# Patient Record
Sex: MALE | Race: WHITE | NOT HISPANIC OR LATINO | URBAN - METROPOLITAN AREA
[De-identification: names, ages, dates, MRNs, and addresses within clinical notes are randomized per-mention and may not be internally consistent; named-entity substitution may affect disease eponyms.]

---

## 2017-12-11 ENCOUNTER — APPOINTMENT (OUTPATIENT)
Dept: LAB | Facility: HOSPITAL | Age: 67
End: 2017-12-11
Attending: UROLOGY
Payer: COMMERCIAL

## 2017-12-11 ENCOUNTER — GENERIC CONVERSION - ENCOUNTER (OUTPATIENT)
Dept: OTHER | Facility: OTHER | Age: 67
End: 2017-12-11

## 2017-12-11 DIAGNOSIS — C67.2 MALIGNANT NEOPLASM OF LATERAL WALL OF BLADDER (HCC): ICD-10-CM

## 2017-12-11 PROCEDURE — 88112 CYTOPATH CELL ENHANCE TECH: CPT

## 2018-01-24 VITALS
WEIGHT: 199 LBS | HEIGHT: 71 IN | DIASTOLIC BLOOD PRESSURE: 80 MMHG | BODY MASS INDEX: 27.86 KG/M2 | SYSTOLIC BLOOD PRESSURE: 138 MMHG

## 2018-12-11 DIAGNOSIS — N40.1 ENLARGED PROSTATE WITH LOWER URINARY TRACT SYMPTOMS (LUTS): ICD-10-CM

## 2018-12-12 DIAGNOSIS — N40.1 BPH WITH OBSTRUCTION/LOWER URINARY TRACT SYMPTOMS: Primary | ICD-10-CM

## 2018-12-12 DIAGNOSIS — N13.8 BPH WITH OBSTRUCTION/LOWER URINARY TRACT SYMPTOMS: Primary | ICD-10-CM

## 2018-12-13 RX ORDER — TAMSULOSIN HYDROCHLORIDE 0.4 MG/1
CAPSULE ORAL
Qty: 90 CAPSULE | Refills: 3 | Status: SHIPPED | OUTPATIENT
Start: 2018-12-13 | End: 2019-12-11 | Stop reason: SDUPTHER

## 2019-01-11 DIAGNOSIS — R35.0 BENIGN PROSTATIC HYPERPLASIA WITH URINARY FREQUENCY: Primary | ICD-10-CM

## 2019-01-11 DIAGNOSIS — N40.1 BENIGN PROSTATIC HYPERPLASIA WITH URINARY FREQUENCY: Primary | ICD-10-CM

## 2019-01-15 RX ORDER — GLIMEPIRIDE 2 MG/1
TABLET ORAL
Refills: 2 | COMMUNITY
Start: 2018-12-06

## 2019-01-15 RX ORDER — SIMVASTATIN 40 MG
40 TABLET ORAL DAILY
COMMUNITY
Start: 2018-08-13

## 2019-01-15 RX ORDER — AMLODIPINE BESYLATE AND BENAZEPRIL HYDROCHLORIDE 10; 20 MG/1; MG/1
1 CAPSULE ORAL DAILY
Refills: 1 | COMMUNITY
Start: 2018-11-19

## 2019-01-15 RX ORDER — ROSUVASTATIN CALCIUM 20 MG/1
TABLET, COATED ORAL DAILY
COMMUNITY

## 2019-01-21 ENCOUNTER — PROCEDURE VISIT (OUTPATIENT)
Dept: UROLOGY | Facility: MEDICAL CENTER | Age: 69
End: 2019-01-21
Payer: COMMERCIAL

## 2019-01-21 VITALS
HEIGHT: 71 IN | HEART RATE: 67 BPM | DIASTOLIC BLOOD PRESSURE: 78 MMHG | SYSTOLIC BLOOD PRESSURE: 152 MMHG | BODY MASS INDEX: 27.86 KG/M2 | WEIGHT: 199 LBS

## 2019-01-21 DIAGNOSIS — Z85.51 PERSONAL HISTORY OF BLADDER CANCER: Primary | ICD-10-CM

## 2019-01-21 DIAGNOSIS — N40.1 BENIGN PROSTATIC HYPERPLASIA WITH URINARY OBSTRUCTION: ICD-10-CM

## 2019-01-21 DIAGNOSIS — N13.8 BENIGN PROSTATIC HYPERPLASIA WITH URINARY OBSTRUCTION: ICD-10-CM

## 2019-01-21 LAB
SL AMB  POCT GLUCOSE, UA: NORMAL
SL AMB LEUKOCYTE ESTERASE,UA: NORMAL
SL AMB POCT BILIRUBIN,UA: NORMAL
SL AMB POCT BLOOD,UA: NORMAL
SL AMB POCT CLARITY,UA: CLEAR
SL AMB POCT COLOR,UA: YELLOW
SL AMB POCT KETONES,UA: NORMAL
SL AMB POCT NITRITE,UA: NORMAL
SL AMB POCT PH,UA: 6
SL AMB POCT SPECIFIC GRAVITY,UA: 1.02
SL AMB POCT URINE PROTEIN: NORMAL
SL AMB POCT UROBILINOGEN: 0.2

## 2019-01-21 PROCEDURE — 81003 URINALYSIS AUTO W/O SCOPE: CPT | Performed by: UROLOGY

## 2019-01-21 PROCEDURE — 52000 CYSTOURETHROSCOPY: CPT | Performed by: UROLOGY

## 2019-01-21 PROCEDURE — 99213 OFFICE O/P EST LOW 20 MIN: CPT | Performed by: UROLOGY

## 2019-01-21 NOTE — ASSESSMENT & PLAN NOTE
The patient remains on tamsulosin  He is satisfied with his voiding pattern  Minimally invasive therapies were discussed today including urolift  Information regarding urolift was given  He will consider this option  In the interim his prescription for tamsulosin has been refilled for 1 year  PSA level is pending and will be rechecked in 1 year

## 2019-01-21 NOTE — ASSESSMENT & PLAN NOTE
There is no evidence of recurrence  The upper tracts were screen by renal ultrasound in January 2017 were unremarkable  Urinalysis today is negative  We will plan to repeat cystoscopy in 1 year

## 2019-01-21 NOTE — LETTER
January 21, 2019     Rashaun Foy MD  [place] [id number]    Patient: Linda Buckner   YOB: 1950   Date of Visit: 1/21/2019       Dear Dr Evelyne Handy: Thank you for referring Linda Buckner to me for evaluation  Below are my notes for this consultation  If you have questions, please do not hesitate to call me  I look forward to following your patient along with you  Sincerely,        Leisa Severs, MD        CC: No Recipients  Leisa Severs, MD  1/21/2019  9:38 AM  Sign at close encounter  Assessment/Plan:    Personal history of bladder cancer  There is no evidence of recurrence  The upper tracts were screen by renal ultrasound in January 2017 were unremarkable  Urinalysis today is negative  We will plan to repeat cystoscopy in 1 year  Benign prostatic hyperplasia with urinary obstruction  The patient remains on tamsulosin  He is satisfied with his voiding pattern  Minimally invasive therapies were discussed today including urolift  Information regarding urolift was given  He will consider this option  In the interim his prescription for tamsulosin has been refilled for 1 year  PSA level is pending and will be rechecked in 1 year  Diagnoses and all orders for this visit:    Personal history of bladder cancer  -     POCT urine dip auto non-scope  -     Cystoscopy    Benign prostatic hyperplasia with urinary obstruction  -     PSA Total, Diagnostic; Future          Subjective:      Patient ID: Linda Buckner is a 76 y o  male  Chief complaint:  History of bladder cancer    80-year-old male who underwent TURBT in 2012  Pathology revealed a high-grade noninvasive lesion  He has not had any recurrence  He denies gross hematuria, dysuria or symptoms of infection  He has no flank pain  He returns today for cystoscopy  Upper tracts were last screened by ultrasound in January 2017 and were unremarkable          Benign Prostatic Hypertrophy   This is a chronic problem  The current episode started more than 1 year ago  The problem is unchanged  Irritative symptoms include nocturia  Irritative symptoms do not include frequency or urgency  Obstructive symptoms do not include dribbling, incomplete emptying, an intermittent stream, a slower stream, straining or a weak stream  Pertinent negatives include no chills, dysuria, genital pain, hematuria, hesitancy, nausea or vomiting  Nothing aggravates the symptoms  Past treatments include tamsulosin  The treatment provided moderate relief  He has been using treatment for 2 or more years  The following portions of the patient's history were reviewed and updated as appropriate: allergies, current medications, past family history, past medical history, past social history, past surgical history and problem list     Review of Systems   Constitutional: Negative for chills, diaphoresis, fatigue and fever  HENT: Negative  Eyes: Negative  Respiratory: Negative  Cardiovascular: Negative  Gastrointestinal: Negative  Negative for nausea and vomiting  Endocrine: Negative  Genitourinary: Positive for nocturia  Negative for dysuria, frequency, hematuria, hesitancy, incomplete emptying and urgency  See HPI   Musculoskeletal: Negative  Skin: Negative  Allergic/Immunologic: Negative  Neurological: Negative  Hematological: Negative  Psychiatric/Behavioral: Negative  Objective:      /78 (BP Location: Left arm, Patient Position: Sitting, Cuff Size: Adult)   Pulse 67   Ht 5' 11" (1 803 m)   Wt 90 3 kg (199 lb)   BMI 27 75 kg/m²           Physical Exam   Constitutional: He is oriented to person, place, and time  He appears well-developed and well-nourished  HENT:   Head: Normocephalic and atraumatic  Eyes: Conjunctivae are normal    Neck: Neck supple  Cardiovascular: Normal rate  Pulmonary/Chest: Effort normal    Abdominal: Soft   Bowel sounds are normal  He exhibits no distension and no mass  There is no tenderness  There is no rebound, no guarding and no CVA tenderness  Genitourinary: Rectum normal, testes normal and penis normal  Right testis shows no mass  Left testis shows no mass  No phimosis or hypospadias  Genitourinary Comments: Prostate 1 5 times enlarged and palpably benign  Musculoskeletal: He exhibits no edema  Neurological: He is alert and oriented to person, place, and time  Skin: Skin is warm and dry  Psychiatric: He has a normal mood and affect  His behavior is normal  Judgment and thought content normal    Vitals reviewed  Cystoscopy  Date/Time: 1/21/2019 9:21 AM  Performed by: Abilio Guan  Authorized by: Abilio Guan     Procedure details: cystoscopy    Patient tolerance: Patient tolerated the procedure well with no immediate complications    Additional Procedure Details: Cystoscopy Procedure Note        Pre-operative Diagnosis:  History of bladder cancer    Post-operative Diagnosis:  No evidence recurrence      Procedure Details   The risks, benefits, complications, treatment options, and expected outcomes were discussed with the patient  The patient concurred with the proposed plan, giving informed consent  Cystoscopy was performed today under local anesthesia, using sterile technique  The patient was placed in the supine position, prepped and draped in the usual sterile fashion  A 15 Bulgarian flexible cystoscope  was used to inspect both the urethra and bladder  Findings:  Normal urethra, 30-40 g prostate causing complete outflow obstruction  Mild elevation of the median bar but no  significant median lobe  Bladder is mildly trabeculated  Ureteral orifices are normal   There are no stones, tumors or evidence of recurrence             Specimens:  Urinalysis is negative

## 2019-01-21 NOTE — PATIENT INSTRUCTIONS

## 2019-01-21 NOTE — PROGRESS NOTES
Assessment/Plan:    Personal history of bladder cancer  There is no evidence of recurrence  The upper tracts were screen by renal ultrasound in January 2017 were unremarkable  Urinalysis today is negative  We will plan to repeat cystoscopy in 1 year  Benign prostatic hyperplasia with urinary obstruction  The patient remains on tamsulosin  He is satisfied with his voiding pattern  Minimally invasive therapies were discussed today including urolift  Information regarding urolift was given  He will consider this option  In the interim his prescription for tamsulosin has been refilled for 1 year  PSA level is pending and will be rechecked in 1 year  Diagnoses and all orders for this visit:    Personal history of bladder cancer  -     POCT urine dip auto non-scope  -     Cystoscopy    Benign prostatic hyperplasia with urinary obstruction  -     PSA Total, Diagnostic; Future          Subjective:      Patient ID: Linda Buckner is a 76 y o  male  Chief complaint:  History of bladder cancer    80-year-old male who underwent TURBT in 2012  Pathology revealed a high-grade noninvasive lesion  He has not had any recurrence  He denies gross hematuria, dysuria or symptoms of infection  He has no flank pain  He returns today for cystoscopy  Upper tracts were last screened by ultrasound in January 2017 and were unremarkable  Benign Prostatic Hypertrophy   This is a chronic problem  The current episode started more than 1 year ago  The problem is unchanged  Irritative symptoms include nocturia  Irritative symptoms do not include frequency or urgency  Obstructive symptoms do not include dribbling, incomplete emptying, an intermittent stream, a slower stream, straining or a weak stream  Pertinent negatives include no chills, dysuria, genital pain, hematuria, hesitancy, nausea or vomiting  Nothing aggravates the symptoms  Past treatments include tamsulosin  The treatment provided moderate relief   He has been using treatment for 2 or more years  The following portions of the patient's history were reviewed and updated as appropriate: allergies, current medications, past family history, past medical history, past social history, past surgical history and problem list     Review of Systems   Constitutional: Negative for chills, diaphoresis, fatigue and fever  HENT: Negative  Eyes: Negative  Respiratory: Negative  Cardiovascular: Negative  Gastrointestinal: Negative  Negative for nausea and vomiting  Endocrine: Negative  Genitourinary: Positive for nocturia  Negative for dysuria, frequency, hematuria, hesitancy, incomplete emptying and urgency  See HPI   Musculoskeletal: Negative  Skin: Negative  Allergic/Immunologic: Negative  Neurological: Negative  Hematological: Negative  Psychiatric/Behavioral: Negative  Objective:      /78 (BP Location: Left arm, Patient Position: Sitting, Cuff Size: Adult)   Pulse 67   Ht 5' 11" (1 803 m)   Wt 90 3 kg (199 lb)   BMI 27 75 kg/m²          Physical Exam   Constitutional: He is oriented to person, place, and time  He appears well-developed and well-nourished  HENT:   Head: Normocephalic and atraumatic  Eyes: Conjunctivae are normal    Neck: Neck supple  Cardiovascular: Normal rate  Pulmonary/Chest: Effort normal    Abdominal: Soft  Bowel sounds are normal  He exhibits no distension and no mass  There is no tenderness  There is no rebound, no guarding and no CVA tenderness  Genitourinary: Rectum normal, testes normal and penis normal  Right testis shows no mass  Left testis shows no mass  No phimosis or hypospadias  Genitourinary Comments: Prostate 1 5 times enlarged and palpably benign  Musculoskeletal: He exhibits no edema  Neurological: He is alert and oriented to person, place, and time  Skin: Skin is warm and dry  Psychiatric: He has a normal mood and affect   His behavior is normal  Judgment and thought content normal    Vitals reviewed  Cystoscopy  Date/Time: 1/21/2019 9:21 AM  Performed by: Bibiana Medrano  Authorized by: Bibiana Medrano     Procedure details: cystoscopy    Patient tolerance: Patient tolerated the procedure well with no immediate complications    Additional Procedure Details: Cystoscopy Procedure Note        Pre-operative Diagnosis:  History of bladder cancer    Post-operative Diagnosis:  No evidence recurrence      Procedure Details   The risks, benefits, complications, treatment options, and expected outcomes were discussed with the patient  The patient concurred with the proposed plan, giving informed consent  Cystoscopy was performed today under local anesthesia, using sterile technique  The patient was placed in the supine position, prepped and draped in the usual sterile fashion  A 15 Cypriot flexible cystoscope  was used to inspect both the urethra and bladder  Findings:  Normal urethra, 30-40 g prostate causing complete outflow obstruction  Mild elevation of the median bar but no  significant median lobe  Bladder is mildly trabeculated  Ureteral orifices are normal   There are no stones, tumors or evidence of recurrence             Specimens:  Urinalysis is negative

## 2019-12-11 DIAGNOSIS — N13.8 BPH WITH OBSTRUCTION/LOWER URINARY TRACT SYMPTOMS: ICD-10-CM

## 2019-12-11 DIAGNOSIS — N40.1 BPH WITH OBSTRUCTION/LOWER URINARY TRACT SYMPTOMS: ICD-10-CM

## 2019-12-12 RX ORDER — TAMSULOSIN HYDROCHLORIDE 0.4 MG/1
CAPSULE ORAL
Qty: 90 CAPSULE | Refills: 3 | Status: SHIPPED | OUTPATIENT
Start: 2019-12-12 | End: 2020-12-15

## 2020-01-27 ENCOUNTER — PROCEDURE VISIT (OUTPATIENT)
Dept: UROLOGY | Facility: MEDICAL CENTER | Age: 70
End: 2020-01-27
Payer: COMMERCIAL

## 2020-01-27 VITALS
SYSTOLIC BLOOD PRESSURE: 140 MMHG | HEIGHT: 71 IN | BODY MASS INDEX: 27.3 KG/M2 | DIASTOLIC BLOOD PRESSURE: 78 MMHG | WEIGHT: 195 LBS

## 2020-01-27 DIAGNOSIS — N40.1 BENIGN PROSTATIC HYPERPLASIA WITH URINARY OBSTRUCTION: Primary | ICD-10-CM

## 2020-01-27 DIAGNOSIS — Z85.51 PERSONAL HISTORY OF BLADDER CANCER: ICD-10-CM

## 2020-01-27 DIAGNOSIS — N13.8 BENIGN PROSTATIC HYPERPLASIA WITH URINARY OBSTRUCTION: Primary | ICD-10-CM

## 2020-01-27 PROCEDURE — 52000 CYSTOURETHROSCOPY: CPT | Performed by: UROLOGY

## 2020-01-27 PROCEDURE — 99213 OFFICE O/P EST LOW 20 MIN: CPT | Performed by: UROLOGY

## 2020-01-27 PROCEDURE — 81003 URINALYSIS AUTO W/O SCOPE: CPT | Performed by: UROLOGY

## 2020-01-27 RX ORDER — MULTIVITAMIN WITH IRON
100 TABLET ORAL DAILY
COMMUNITY
Start: 2020-01-26 | End: 2022-02-21

## 2020-01-27 NOTE — ASSESSMENT & PLAN NOTE
AUA symptom score is 8 on tamsulosin  He is pleased with his voiding pattern  Last year the patient's PSA was 1 9  PSA on January 14, 2020 is 1 5  Digital rectal examination is benign in nature  He is satisfied with his voiding pattern on tamsulosin  He is not interested in any other treatment options at this time  He will continue tamsulosin and will return in 1 year

## 2020-01-27 NOTE — PROGRESS NOTES
Assessment/Plan:    Personal history of bladder cancer    Upper tracts were last assessed by ultrasound in 2017  They were normal   Urinalysis is negative  Cystoscopy today documents no evidence of recurrence  He will return in 1 year and we will plan to repeat his cystoscopy at that time  Benign prostatic hyperplasia with urinary obstruction  AUA symptom score is 8 on tamsulosin  He is pleased with his voiding pattern  Last year the patient's PSA was 1 9  PSA on January 14, 2020 is 1 5  Digital rectal examination is benign in nature  He is satisfied with his voiding pattern on tamsulosin  He is not interested in any other treatment options at this time  He will continue tamsulosin and will return in 1 year  Diagnoses and all orders for this visit:    Benign prostatic hyperplasia with urinary obstruction  -     POCT urine dip auto non-scope    Personal history of bladder cancer  -     Cystoscopy    Other orders  -     pyridoxine (VITAMIN B6) 100 mg tablet; Take 100 mg by mouth daily  -     dextran 70-hypromellose (ARTIFICIAL TEARS) 0 1-0 3 % ophthalmic solution; 1 drop every 3 (three) hours as needed          Subjective:      Patient ID: Cj Maddox is a 71 y o  male  Benign Prostatic Hypertrophy   This is a chronic problem  The current episode started more than 1 year ago  The problem is unchanged  Irritative symptoms include nocturia (Nocturia x2)  Irritative symptoms do not include urgency  Obstructive symptoms include a slower stream  Obstructive symptoms do not include dribbling, incomplete emptying, an intermittent stream, straining or a weak stream  Pertinent negatives include no chills, dysuria, hematuria, hesitancy or nausea  AUA score is 8-19  His sexual activity is non-contributory to the current illness  Nothing aggravates the symptoms  Past treatments include tamsulosin  The treatment provided significant relief  He has been using treatment for 2 or more years       History of bladder cancer- the patient has a history of bladder cancer  There has been no recurrence recently  He has no gross hematuria, dysuria, irritative voiding symptoms or change in his back/ flank pain  The following portions of the patient's history were reviewed and updated as appropriate: allergies, current medications, past family history, past medical history, past social history, past surgical history and problem list     Review of Systems   Constitutional: Negative for chills, diaphoresis, fatigue and fever  HENT: Negative  Eyes: Negative  Respiratory: Negative  Cardiovascular: Negative  Gastrointestinal: Negative  Negative for nausea  Endocrine: Negative  Genitourinary: Positive for nocturia (Nocturia x2)  Negative for dysuria, hematuria, hesitancy, incomplete emptying and urgency  See HPI   Musculoskeletal: Positive for back pain  Chronic back/flank pain  Skin: Negative  Allergic/Immunologic: Negative  Neurological: Negative  Hematological: Negative  Psychiatric/Behavioral: Negative  AUA SYMPTOM SCORE      Most Recent Value   AUA SYMPTOM SCORE   How often have you had a sensation of not emptying your bladder completely after you finished urinating? 0   How often have you had to urinate again less than two hours after you finished urinating? 1   How often have you found you stopped and started again several times when you urinate? 1   How often have you found it difficult to postpone urination? 0   How often have you had a weak urinary stream?  4   How often have you had to push or strain to begin urination? 0   How many times did you most typically get up to urinate from the time you went to bed at night until the time you got up in the morning?   2   Quality of Life: If you were to spend the rest of your life with your urinary condition just the way it is now, how would you feel about that?  1   AUA SYMPTOM SCORE  8        Objective:      BP 140/78   Ht 5' 11" (1 803 m)   Wt 88 5 kg (195 lb)   BMI 27 20 kg/m²            Physical Exam   Constitutional: He is oriented to person, place, and time  He appears well-developed and well-nourished  HENT:   Head: Normocephalic and atraumatic  Eyes: Conjunctivae are normal    Neck: Neck supple  Cardiovascular: Normal rate  Pulmonary/Chest: Effort normal    Abdominal: Soft  Bowel sounds are normal  He exhibits no distension and no mass  There is no tenderness  There is no rebound, no guarding and no CVA tenderness  Genitourinary: Rectum normal, testes normal and penis normal  Right testis shows no mass  Left testis shows no mass  No phimosis or hypospadias  Genitourinary Comments: Prostate 1 5 X enlarged and palpably benign  Musculoskeletal: He exhibits no edema  Neurological: He is alert and oriented to person, place, and time  Skin: Skin is warm and dry  Psychiatric: He has a normal mood and affect  His behavior is normal  Judgment and thought content normal    Vitals reviewed  Cystoscopy  Date/Time: 1/27/2020 9:15 AM  Performed by: Hilaria Pak MD  Authorized by: Hilaria Pak MD     Procedure details: cystoscopy    Patient tolerance: Patient tolerated the procedure well with no immediate complications    Additional Procedure Details: Cystoscopy Procedure Note        Pre-operative Diagnosis:   History of bladder cancer    Post-operative Diagnosis:  No evidence of recurrence  Procedure Details   The risks, benefits, complications, treatment options, and expected outcomes were discussed with the patient  The patient concurred with the proposed plan, giving informed consent  Cystoscopy was performed today under local anesthesia, using sterile technique  The patient was placed in the supine position, prepped and draped in the usual sterile fashion  A 15 Czech flexible cystoscope  was used to inspect both the urethra and bladder    Findings:   Normal urethra with predominantly bilobar prostatic hypertrophy approximately 20 g  There is no median lobe or intravesical prostatic extension  Mild changes of cystitis cystica are noted on the bladder neck  The bladder is moderate trabeculated  No stones, tumors or diverticula  Ureteral orifices are normal with clear efflux

## 2020-01-27 NOTE — PATIENT INSTRUCTIONS
Benign Prostatic Hypertrophy   WHAT YOU NEED TO KNOW:   Benign prostatic hypertrophy (BPH) is a condition that causes your prostate gland to grow larger than normal  The prostate gland is the male sex gland that produces a fluid that is part of semen  It is about the size of a walnut and it is located under the bladder  As the prostate grows, it can squeeze the urethra  This can block urine flow and cause urinary problems  DISCHARGE INSTRUCTIONS:   Medicines:   · Alpha blockers: This medicine relaxes the muscles in your prostate and bladder  It may help you urinate more easily  · 5 alpha reductase inhibitors: These medicines block the production of a hormone that causes the prostate to get larger  It may help slow the growth of the prostate or shrink the prostate  · Take your medicine as directed  Contact your healthcare provider if you think your medicine is not helping or if you have side effects  Tell him or her if you are allergic to any medicine  Keep a list of the medicines, vitamins, and herbs you take  Include the amounts, and when and why you take them  Bring the list or the pill bottles to follow-up visits  Carry your medicine list with you in case of an emergency  Follow up with your healthcare provider as directed:  Write down your questions so you remember to ask them during your visits  Manage BPH:   · Do not let your bladder get too full before you empty it  Urinate when you feel the urge  · Limit alcohol  Do not drink large amounts of any liquid at one time  · Decrease the amount of salt you eat  Examples of salty foods are chips, cured meats, and canned soups  Do not use table salt  · Healthcare providers may tell you not to eat spicy foods such as chilli peppers  This may help you find out if spicy food makes your BPH symptoms worse  · You may have sex if you feel well  Contact your healthcare provider if:   · There is a large amount of blood in your urine  · Your signs and symptoms get worse  · You have a fever  · You have questions or concerns about your condition or care  Seek care immediately if:   · You are unable to urinate  · Your bladder feels very full and painful  © 2017 [employer] Information is for End User's use only and may not be sold, redistributed or otherwise used for commercial purposes  All illustrations and images included in CareNotes® are the copyrighted property of [employer] M [employer]  or [employer]  The above information is an  only  It is not intended as medical advice for individual conditions or treatments  Talk to your doctor, nurse or pharmacist before following any medical regimen to see if it is safe and effective for you  Cystoscopy   WHAT YOU NEED TO KNOW:   A cystoscopy is a procedure to look inside of your urethra and bladder using a cystoscope  A cystoscope is a small tube with a light and magnifying camera on the end  The procedure is used to diagnose and treat conditions of the bladder, urethra, and prostate  The procedure is also done to remove stones or blood clots from the urethra or bladder  Your healthcare provider may do other tests, such as ureteroscopy, during a cystoscopy  DISCHARGE INSTRUCTIONS:   Call 911 if:   · You suddenly have chest pain or trouble breathing  Seek care immediately if:   · Your urine turns from pink to red, or you have clots in your urine  · You cannot urinate and your bladder feels full  · Your pain or burning becomes worse or lasts longer than 2 days  Contact your healthcare provider or urologist if:   · Your urine stays pink for longer than 3 days  · You urinate less than normal, or still feel like you have to urinate after you use the bathroom  · Your skin is itchy, swollen, or has a new rash  · You have a fever and chills  · You have questions or concerns about your condition or care  Medicines:   You may  be given any of the following:  · Antibiotics  help treat or prevent a bacterial infection  · Acetaminophen  decreases pain and fever  It is available without a doctor's order  Ask how much to take and how often to take it  Follow directions  Read the labels of all other medicines you are using to see if they also contain acetaminophen, or ask your doctor or pharmacist  Acetaminophen can cause liver damage if not taken correctly  Do not use more than 4 grams (4,000 milligrams) total of acetaminophen in one day  · Take your medicine as directed  Contact your healthcare provider if you think your medicine is not helping or if you have side effects  Tell him or her if you are allergic to any medicine  Keep a list of the medicines, vitamins, and herbs you take  Include the amounts, and when and why you take them  Bring the list or the pill bottles to follow-up visits  Carry your medicine list with you in case of an emergency  Follow up with your healthcare provider as directed: You may need to have another cystoscopy  Write down your questions so you remember to ask them during your visits  Self-care:   · Drink at least 3 to 4 glasses of water daily for 2 days after your procedure  Do not drink acidic juices such as orange juice and lemonade  Drink water to help prevent blood clots from forming  It can also help decrease the amount of acid in your urine  Acid in your urine may increase the burning feeling when you urinate  · Sit in a warm tub of water  Warm water may relieve pain and bladder spasms  · Do not have sex  until your healthcare provider tells you it is okay  Sex may increase your risk for a urinary tract infection  © 2017 [employer]  Information is for End User's use only and may not be sold, redistributed or otherwise used for commercial purposes   All illustrations and images included in CareNotes® are the copyrighted property of A D [employer] M [employer] Inc  or [employer]  The above information is an  only  It is not intended as medical advice for individual conditions or treatments  Talk to your doctor, nurse or pharmacist before following any medical regimen to see if it is safe and effective for you

## 2020-01-27 NOTE — ASSESSMENT & PLAN NOTE
Upper tracts were last assessed by ultrasound in 2017  They were normal   Urinalysis is negative  Cystoscopy today documents no evidence of recurrence  He will return in 1 year and we will plan to repeat his cystoscopy at that time

## 2020-01-27 NOTE — LETTER
January 27, 2020     [place] [address] [id number]    Patient: Vicente Gil   YOB: 1950   Date of Visit: 1/27/2020       Dear Dr Angelique Fong: Thank you for referring Vicente Gil to me for evaluation  Below are my notes for this consultation  If you have questions, please do not hesitate to call me  I look forward to following your patient along with you  Sincerely,        Almas Huertas MD        CC: No Recipients  Almas Huertas MD  1/27/2020  9:31 AM  Sign at close encounter  Assessment/Plan:    Personal history of bladder cancer    Upper tracts were last assessed by ultrasound in 2017  They were normal   Urinalysis is negative  Cystoscopy today documents no evidence of recurrence  He will return in 1 year and we will plan to repeat his cystoscopy at that time  Benign prostatic hyperplasia with urinary obstruction  AUA symptom score is 8 on tamsulosin  He is pleased with his voiding pattern  Last year the patient's PSA was 1 9  PSA on January 14, 2020 is 1 5  Digital rectal examination is benign in nature  He is satisfied with his voiding pattern on tamsulosin  He is not interested in any other treatment options at this time  He will continue tamsulosin and will return in 1 year  Diagnoses and all orders for this visit:    Benign prostatic hyperplasia with urinary obstruction  -     POCT urine dip auto non-scope    Personal history of bladder cancer  -     Cystoscopy    Other orders  -     pyridoxine (VITAMIN B6) 100 mg tablet; Take 100 mg by mouth daily  -     dextran 70-hypromellose (ARTIFICIAL TEARS) 0 1-0 3 % ophthalmic solution; 1 drop every 3 (three) hours as needed          Subjective:      Patient ID: Vicente Gil is a 71 y o  male  Benign Prostatic Hypertrophy   This is a chronic problem  The current episode started more than 1 year ago  The problem is unchanged  Irritative symptoms include nocturia (Nocturia x2)   Irritative symptoms do not include urgency  Obstructive symptoms include a slower stream  Obstructive symptoms do not include dribbling, incomplete emptying, an intermittent stream, straining or a weak stream  Pertinent negatives include no chills, dysuria, hematuria, hesitancy or nausea  AUA score is 8-19  His sexual activity is non-contributory to the current illness  Nothing aggravates the symptoms  Past treatments include tamsulosin  The treatment provided significant relief  He has been using treatment for 2 or more years  History of bladder cancer- the patient has a history of bladder cancer  There has been no recurrence recently  He has no gross hematuria, dysuria, irritative voiding symptoms or change in his back/ flank pain  The following portions of the patient's history were reviewed and updated as appropriate: allergies, current medications, past family history, past medical history, past social history, past surgical history and problem list     Review of Systems   Constitutional: Negative for chills, diaphoresis, fatigue and fever  HENT: Negative  Eyes: Negative  Respiratory: Negative  Cardiovascular: Negative  Gastrointestinal: Negative  Negative for nausea  Endocrine: Negative  Genitourinary: Positive for nocturia (Nocturia x2)  Negative for dysuria, hematuria, hesitancy, incomplete emptying and urgency  See HPI   Musculoskeletal: Positive for back pain  Chronic back/flank pain  Skin: Negative  Allergic/Immunologic: Negative  Neurological: Negative  Hematological: Negative  Psychiatric/Behavioral: Negative  AUA SYMPTOM SCORE      Most Recent Value   AUA SYMPTOM SCORE   How often have you had a sensation of not emptying your bladder completely after you finished urinating? 0   How often have you had to urinate again less than two hours after you finished urinating?   1   How often have you found you stopped and started again several times when you urinate? 1   How often have you found it difficult to postpone urination? 0   How often have you had a weak urinary stream?  4   How often have you had to push or strain to begin urination? 0   How many times did you most typically get up to urinate from the time you went to bed at night until the time you got up in the morning? 2   Quality of Life: If you were to spend the rest of your life with your urinary condition just the way it is now, how would you feel about that?  1   AUA SYMPTOM SCORE  8        Objective:      /78   Ht 5' 11" (1 803 m)   Wt 88 5 kg (195 lb)   BMI 27 20 kg/m²             Physical Exam   Constitutional: He is oriented to person, place, and time  He appears well-developed and well-nourished  HENT:   Head: Normocephalic and atraumatic  Eyes: Conjunctivae are normal    Neck: Neck supple  Cardiovascular: Normal rate  Pulmonary/Chest: Effort normal    Abdominal: Soft  Bowel sounds are normal  He exhibits no distension and no mass  There is no tenderness  There is no rebound, no guarding and no CVA tenderness  Genitourinary: Rectum normal, testes normal and penis normal  Right testis shows no mass  Left testis shows no mass  No phimosis or hypospadias  Genitourinary Comments: Prostate 1 5 X enlarged and palpably benign  Musculoskeletal: He exhibits no edema  Neurological: He is alert and oriented to person, place, and time  Skin: Skin is warm and dry  Psychiatric: He has a normal mood and affect  His behavior is normal  Judgment and thought content normal    Vitals reviewed        Cystoscopy  Date/Time: 1/27/2020 9:15 AM  Performed by: Jane Nuñez MD  Authorized by: Jane Nuñez MD     Procedure details: cystoscopy    Patient tolerance: Patient tolerated the procedure well with no immediate complications    Additional Procedure Details: Cystoscopy Procedure Note        Pre-operative Diagnosis:   History of bladder cancer    Post-operative Diagnosis:  No evidence of recurrence  Procedure Details   The risks, benefits, complications, treatment options, and expected outcomes were discussed with the patient  The patient concurred with the proposed plan, giving informed consent  Cystoscopy was performed today under local anesthesia, using sterile technique  The patient was placed in the supine position, prepped and draped in the usual sterile fashion  A 15 Slovak flexible cystoscope  was used to inspect both the urethra and bladder  Findings:   Normal urethra with predominantly bilobar prostatic hypertrophy approximately 20 g  There is no median lobe or intravesical prostatic extension  Mild changes of cystitis cystica are noted on the bladder neck  The bladder is moderate trabeculated  No stones, tumors or diverticula  Ureteral orifices are normal with clear efflux

## 2020-07-13 DIAGNOSIS — R50.9 FEVER, UNSPECIFIED FEVER CAUSE: ICD-10-CM

## 2020-07-13 PROCEDURE — U0003 INFECTIOUS AGENT DETECTION BY NUCLEIC ACID (DNA OR RNA); SEVERE ACUTE RESPIRATORY SYNDROME CORONAVIRUS 2 (SARS-COV-2) (CORONAVIRUS DISEASE [COVID-19]), AMPLIFIED PROBE TECHNIQUE, MAKING USE OF HIGH THROUGHPUT TECHNOLOGIES AS DESCRIBED BY CMS-2020-01-R: HCPCS

## 2020-07-15 LAB — SARS-COV-2 RNA SPEC QL NAA+PROBE: NOT DETECTED

## 2020-07-17 ENCOUNTER — TELEPHONE (OUTPATIENT)
Dept: OTHER | Facility: OTHER | Age: 70
End: 2020-07-17

## 2020-07-17 NOTE — TELEPHONE ENCOUNTER
The patient was called for notification of a NEGATIVE test result for COVID-19  The patient did not answer the phone and a voicemail was left requesting a call back to [phone], Option 7

## 2020-07-17 NOTE — TELEPHONE ENCOUNTER
Your test for COVID-19, also known as novel coronavirus, came back negative  You do not have COVID-19  If you have any additional questions, we can schedule a virtual visit for you with a provider or call the [facility] [phone] Option 7 for care advice  For additional information , please visit the Coronavirus FAQ on the [address]  ([facility]  [facility])

## 2020-12-15 DIAGNOSIS — N40.1 BPH WITH OBSTRUCTION/LOWER URINARY TRACT SYMPTOMS: ICD-10-CM

## 2020-12-15 DIAGNOSIS — N13.8 BPH WITH OBSTRUCTION/LOWER URINARY TRACT SYMPTOMS: ICD-10-CM

## 2020-12-15 RX ORDER — TAMSULOSIN HYDROCHLORIDE 0.4 MG/1
CAPSULE ORAL
Qty: 90 CAPSULE | Refills: 3 | Status: SHIPPED | OUTPATIENT
Start: 2020-12-15 | End: 2022-01-06

## 2021-02-08 ENCOUNTER — PROCEDURE VISIT (OUTPATIENT)
Dept: UROLOGY | Facility: MEDICAL CENTER | Age: 71
End: 2021-02-08
Payer: COMMERCIAL

## 2021-02-08 VITALS — WEIGHT: 195 LBS | BODY MASS INDEX: 27.3 KG/M2 | HEIGHT: 71 IN

## 2021-02-08 DIAGNOSIS — N40.1 BENIGN PROSTATIC HYPERPLASIA WITH URINARY OBSTRUCTION: ICD-10-CM

## 2021-02-08 DIAGNOSIS — N13.8 BPH WITH OBSTRUCTION/LOWER URINARY TRACT SYMPTOMS: Primary | ICD-10-CM

## 2021-02-08 DIAGNOSIS — Z85.51 PERSONAL HISTORY OF BLADDER CANCER: ICD-10-CM

## 2021-02-08 DIAGNOSIS — N40.1 BPH WITH OBSTRUCTION/LOWER URINARY TRACT SYMPTOMS: Primary | ICD-10-CM

## 2021-02-08 DIAGNOSIS — N13.8 BENIGN PROSTATIC HYPERPLASIA WITH URINARY OBSTRUCTION: ICD-10-CM

## 2021-02-08 LAB
SL AMB  POCT GLUCOSE, UA: NORMAL
SL AMB LEUKOCYTE ESTERASE,UA: NORMAL
SL AMB POCT BILIRUBIN,UA: NORMAL
SL AMB POCT BLOOD,UA: NORMAL
SL AMB POCT CLARITY,UA: CLEAR
SL AMB POCT COLOR,UA: YELLOW
SL AMB POCT KETONES,UA: NORMAL
SL AMB POCT NITRITE,UA: NORMAL
SL AMB POCT PH,UA: 7
SL AMB POCT SPECIFIC GRAVITY,UA: 1.02
SL AMB POCT URINE PROTEIN: NORMAL
SL AMB POCT UROBILINOGEN: 0.2

## 2021-02-08 PROCEDURE — 99213 OFFICE O/P EST LOW 20 MIN: CPT | Performed by: UROLOGY

## 2021-02-08 PROCEDURE — 52000 CYSTOURETHROSCOPY: CPT | Performed by: UROLOGY

## 2021-02-08 PROCEDURE — 81003 URINALYSIS AUTO W/O SCOPE: CPT | Performed by: UROLOGY

## 2021-02-08 NOTE — LETTER
February 8, 2021     Juan C Arriola MD  [place] [id number]    Patient: Baldemar Gallardo   YOB: 1950   Date of Visit: 2/8/2021       Dear Dr Faisal Oneill: Thank you for referring Baldemar Gallardo to me for evaluation  Below are my notes for this consultation  If you have questions, please do not hesitate to call me  I look forward to following your patient along with you  Sincerely,        Kia Alejandro MD        CC: No Recipients  Kia Alejandro MD  2/8/2021 10:16 AM  Sign when Signing Visit  Assessment/Plan:    Benign prostatic hyperplasia with urinary obstruction   AUA symptom score is 10  He is satisfied with his voiding pattern  PSA last year was 1 5 ( January 2020)  Digital rectal examination is benign in nature  We will continue to follow on tamsulosin  We will plan to recheck a PSA level at this time  He will return in 1 year and we will recheck his PSA prior to that visit as well  Personal history of bladder cancer    Cystoscopy is unremarkable and reveals no evidence of recurrence  Urinalysis is negative  Upper tracts were last assessed in 2017  We will consider repeat upper tract assessment next year  Diagnoses and all orders for this visit:    BPH with obstruction/lower urinary tract symptoms  -     POCT urine dip auto non-scope  -     PSA Total, Diagnostic; Future  -     PSA Total, Diagnostic; Future    Personal history of bladder cancer  -     Cystoscopy    Benign prostatic hyperplasia with urinary obstruction          Subjective:      Patient ID: Baldemar Gallardo is a 79 y o  male  Benign Prostatic Hypertrophy  This is a chronic problem  The current episode started more than 1 year ago  The problem is unchanged  Irritative symptoms include nocturia (Nocturia x2)  Irritative symptoms do not include urgency   Obstructive symptoms include a slower stream  Obstructive symptoms do not include dribbling, incomplete emptying, an intermittent stream, straining or a weak stream  Pertinent negatives include no chills, dysuria, hematuria, hesitancy or nausea  AUA score is 8-19  His sexual activity is non-contributory to the current illness  Nothing aggravates the symptoms  Past treatments include tamsulosin  The treatment provided significant relief  He has been using treatment for 2 or more years  History of bladder cancer- the patient has a history of bladder cancer  There has been no recurrence recently  He has no gross hematuria, dysuria, irritative voiding symptoms or change in his back/ flank pain  The following portions of the patient's history were reviewed and updated as appropriate: allergies, current medications, past family history, past medical history, past social history, past surgical history and problem list     Review of Systems   Constitutional: Negative for chills, diaphoresis, fatigue and fever  HENT: Negative  Eyes: Negative  Respiratory: Negative  Cardiovascular: Negative  Gastrointestinal: Negative  Negative for nausea  Endocrine: Negative  Genitourinary: Positive for nocturia (Nocturia x2)  Negative for dysuria, hematuria, hesitancy, incomplete emptying and urgency  See HPI   Musculoskeletal: Positive for back pain  Chronic back/flank pain  Skin: Negative  Allergic/Immunologic: Negative  Neurological: Negative  Hematological: Negative  Psychiatric/Behavioral: Negative  AUA SYMPTOM SCORE      Most Recent Value   AUA SYMPTOM SCORE   How often have you had a sensation of not emptying your bladder completely after you finished urinating? 1   How often have you had to urinate again less than two hours after you finished urinating? 0   How often have you found you stopped and started again several times when you urinate? 3   How often have you found it difficult to postpone urination?   1   How often have you had a weak urinary stream?  3   How often have you had to push or strain to begin urination? 0   How many times did you most typically get up to urinate from the time you went to bed at night until the time you got up in the morning? 2   Quality of Life: If you were to spend the rest of your life with your urinary condition just the way it is now, how would you feel about that?  2   AUA SYMPTOM SCORE  10          Objective:      Ht 5' 11" (1 803 m)   Wt 88 5 kg (195 lb)   BMI 27 20 kg/m²            Physical Exam  Vitals signs reviewed  Constitutional:       General: He is not in acute distress  Appearance: Normal appearance  He is well-developed  He is not ill-appearing, toxic-appearing or diaphoretic  HENT:      Head: Normocephalic and atraumatic  Eyes:      General: No scleral icterus  Conjunctiva/sclera: Conjunctivae normal    Neck:      Musculoskeletal: Neck supple  Cardiovascular:      Rate and Rhythm: Normal rate  Pulmonary:      Effort: Pulmonary effort is normal    Abdominal:      General: Bowel sounds are normal  There is no distension  Palpations: Abdomen is soft  There is no mass  Tenderness: There is no abdominal tenderness  There is no right CVA tenderness, left CVA tenderness, guarding or rebound  Hernia: No hernia is present  Genitourinary:     Penis: Normal  No phimosis or hypospadias  Scrotum/Testes: Normal          Right: Mass not present  Left: Mass not present  Rectum: Normal       Comments: Prostate 1 5 X enlarged and palpably benign  Musculoskeletal: Normal range of motion  Skin:     General: Skin is warm and dry  Neurological:      General: No focal deficit present  Mental Status: He is alert and oriented to person, place, and time  Psychiatric:         Mood and Affect: Mood normal          Behavior: Behavior normal          Thought Content:  Thought content normal          Judgment: Judgment normal             Cystoscopy     Date/Time 2/8/2021 9:59 AM     Performed by  Malick Lazo MD Authorized by Ugo Muñoz MD      Universal Protocol:  Consent: Written consent obtained  Risks and benefits: risks, benefits and alternatives were discussed  Consent given by: patient  Patient understanding: patient states understanding of the procedure being performed  Patient identity confirmed: verbally with patient        Procedure Details:  Procedure type: cystoscopy    Additional Procedure Details:      Patient presents for cystoscopy  I have discussed the reasons for doing the test, and the potential risks and complications  Patient expressed understanding, and signed informed consent document  The patient was carefully  positioned supine on the examining table  Sterile preparation was performed on the urethra  Xylocaine jelly was instilled and left  Indwelling for the procedure  The 13 Pashto flexible cystoscope was passed with the following findings:      Urethra: Normal without stricture    Prostate:  lateral lobes - moderate lateral lobe hypertrophy causing outflow obstruction                  median lobe  -no significant median lobe  There is elevation of the median bar    Bladder: Moderate trabeculation, no lesions, tumor, or stones  Patient tolerated the procedure well and was escorted from the examining table

## 2021-02-08 NOTE — ASSESSMENT & PLAN NOTE
AUA symptom score is 10  He is satisfied with his voiding pattern  PSA last year was 1 5 ( January 2020)  Digital rectal examination is benign in nature  We will continue to follow on tamsulosin  We will plan to recheck a PSA level at this time  He will return in 1 year and we will recheck his PSA prior to that visit as well

## 2021-02-08 NOTE — ASSESSMENT & PLAN NOTE
Cystoscopy is unremarkable and reveals no evidence of recurrence  Urinalysis is negative  Upper tracts were last assessed in 2017  We will consider repeat upper tract assessment next year

## 2021-02-08 NOTE — PROGRESS NOTES
Assessment/Plan:    Benign prostatic hyperplasia with urinary obstruction   AUA symptom score is 10  He is satisfied with his voiding pattern  PSA last year was 1 5 ( January 2020)  Digital rectal examination is benign in nature  We will continue to follow on tamsulosin  We will plan to recheck a PSA level at this time  He will return in 1 year and we will recheck his PSA prior to that visit as well  Personal history of bladder cancer    Cystoscopy is unremarkable and reveals no evidence of recurrence  Urinalysis is negative  Upper tracts were last assessed in 2017  We will consider repeat upper tract assessment next year  Diagnoses and all orders for this visit:    BPH with obstruction/lower urinary tract symptoms  -     POCT urine dip auto non-scope  -     PSA Total, Diagnostic; Future  -     PSA Total, Diagnostic; Future    Personal history of bladder cancer  -     Cystoscopy    Benign prostatic hyperplasia with urinary obstruction          Subjective:      Patient ID: Carlota Seo is a 79 y o  male  Benign Prostatic Hypertrophy  This is a chronic problem  The current episode started more than 1 year ago  The problem is unchanged  Irritative symptoms include nocturia (Nocturia x2)  Irritative symptoms do not include urgency  Obstructive symptoms include a slower stream  Obstructive symptoms do not include dribbling, incomplete emptying, an intermittent stream, straining or a weak stream  Pertinent negatives include no chills, dysuria, hematuria, hesitancy or nausea  AUA score is 8-19  His sexual activity is non-contributory to the current illness  Nothing aggravates the symptoms  Past treatments include tamsulosin  The treatment provided significant relief  He has been using treatment for 2 or more years  History of bladder cancer- the patient has a history of bladder cancer  There has been no recurrence recently    He has no gross hematuria, dysuria, irritative voiding symptoms or change in his back/ flank pain  The following portions of the patient's history were reviewed and updated as appropriate: allergies, current medications, past family history, past medical history, past social history, past surgical history and problem list     Review of Systems   Constitutional: Negative for chills, diaphoresis, fatigue and fever  HENT: Negative  Eyes: Negative  Respiratory: Negative  Cardiovascular: Negative  Gastrointestinal: Negative  Negative for nausea  Endocrine: Negative  Genitourinary: Positive for nocturia (Nocturia x2)  Negative for dysuria, hematuria, hesitancy, incomplete emptying and urgency  See HPI   Musculoskeletal: Positive for back pain  Chronic back/flank pain  Skin: Negative  Allergic/Immunologic: Negative  Neurological: Negative  Hematological: Negative  Psychiatric/Behavioral: Negative  AUA SYMPTOM SCORE      Most Recent Value   AUA SYMPTOM SCORE   How often have you had a sensation of not emptying your bladder completely after you finished urinating? 1   How often have you had to urinate again less than two hours after you finished urinating? 0   How often have you found you stopped and started again several times when you urinate? 3   How often have you found it difficult to postpone urination? 1   How often have you had a weak urinary stream?  3   How often have you had to push or strain to begin urination? 0   How many times did you most typically get up to urinate from the time you went to bed at night until the time you got up in the morning? 2   Quality of Life: If you were to spend the rest of your life with your urinary condition just the way it is now, how would you feel about that?  2   AUA SYMPTOM SCORE  10          Objective:      Ht 5' 11" (1 803 m)   Wt 88 5 kg (195 lb)   BMI 27 20 kg/m²            Physical Exam  Vitals signs reviewed  Constitutional:       General: He is not in acute distress  Appearance: Normal appearance  He is well-developed  He is not ill-appearing, toxic-appearing or diaphoretic  HENT:      Head: Normocephalic and atraumatic  Eyes:      General: No scleral icterus  Conjunctiva/sclera: Conjunctivae normal    Neck:      Musculoskeletal: Neck supple  Cardiovascular:      Rate and Rhythm: Normal rate  Pulmonary:      Effort: Pulmonary effort is normal    Abdominal:      General: Bowel sounds are normal  There is no distension  Palpations: Abdomen is soft  There is no mass  Tenderness: There is no abdominal tenderness  There is no right CVA tenderness, left CVA tenderness, guarding or rebound  Hernia: No hernia is present  Genitourinary:     Penis: Normal  No phimosis or hypospadias  Scrotum/Testes: Normal          Right: Mass not present  Left: Mass not present  Rectum: Normal       Comments: Prostate 1 5 X enlarged and palpably benign  Musculoskeletal: Normal range of motion  Skin:     General: Skin is warm and dry  Neurological:      General: No focal deficit present  Mental Status: He is alert and oriented to person, place, and time  Psychiatric:         Mood and Affect: Mood normal          Behavior: Behavior normal          Thought Content: Thought content normal          Judgment: Judgment normal             Cystoscopy     Date/Time 2/8/2021 9:59 AM     Performed by  Almas Huertas MD     Authorized by Almas Huertas MD      Universal Protocol:  Consent: Written consent obtained  Risks and benefits: risks, benefits and alternatives were discussed  Consent given by: patient  Patient understanding: patient states understanding of the procedure being performed  Patient identity confirmed: verbally with patient        Procedure Details:  Procedure type: cystoscopy    Additional Procedure Details:      Patient presents for cystoscopy    I have discussed the reasons for doing the test, and the potential risks and complications  Patient expressed understanding, and signed informed consent document  The patient was carefully  positioned supine on the examining table  Sterile preparation was performed on the urethra  Xylocaine jelly was instilled and left  Indwelling for the procedure  The 13 Turkmen flexible cystoscope was passed with the following findings:      Urethra: Normal without stricture    Prostate:  lateral lobes - moderate lateral lobe hypertrophy causing outflow obstruction                  median lobe  -no significant median lobe  There is elevation of the median bar    Bladder: Moderate trabeculation, no lesions, tumor, or stones  Patient tolerated the procedure well and was escorted from the examining table

## 2021-03-11 ENCOUNTER — TELEPHONE (OUTPATIENT)
Dept: UROLOGY | Facility: MEDICAL CENTER | Age: 71
End: 2021-03-11

## 2021-03-11 NOTE — TELEPHONE ENCOUNTER
----- Message from Niya Heath MD sent at 3/11/2021 12:17 PM EST -----  Inform pt that the  test was normal range but has risen since last year  Let us check another PSA level in 6 months rather than in 1 year  Keep usual follow up appt

## 2022-01-06 DIAGNOSIS — N40.1 BPH WITH OBSTRUCTION/LOWER URINARY TRACT SYMPTOMS: ICD-10-CM

## 2022-01-06 DIAGNOSIS — N13.8 BPH WITH OBSTRUCTION/LOWER URINARY TRACT SYMPTOMS: ICD-10-CM

## 2022-01-06 RX ORDER — TAMSULOSIN HYDROCHLORIDE 0.4 MG/1
CAPSULE ORAL
Qty: 90 CAPSULE | Refills: 3 | Status: SHIPPED | OUTPATIENT
Start: 2022-01-06

## 2022-02-21 ENCOUNTER — PROCEDURE VISIT (OUTPATIENT)
Dept: UROLOGY | Facility: MEDICAL CENTER | Age: 72
End: 2022-02-21
Payer: COMMERCIAL

## 2022-02-21 VITALS
DIASTOLIC BLOOD PRESSURE: 80 MMHG | BODY MASS INDEX: 30.76 KG/M2 | HEIGHT: 67 IN | SYSTOLIC BLOOD PRESSURE: 142 MMHG | WEIGHT: 196 LBS | HEART RATE: 76 BPM

## 2022-02-21 DIAGNOSIS — Z85.51 PERSONAL HISTORY OF BLADDER CANCER: Primary | ICD-10-CM

## 2022-02-21 DIAGNOSIS — N13.8 BPH WITH OBSTRUCTION/LOWER URINARY TRACT SYMPTOMS: ICD-10-CM

## 2022-02-21 DIAGNOSIS — N40.1 BPH WITH OBSTRUCTION/LOWER URINARY TRACT SYMPTOMS: ICD-10-CM

## 2022-02-21 LAB
SL AMB  POCT GLUCOSE, UA: ABNORMAL
SL AMB LEUKOCYTE ESTERASE,UA: ABNORMAL
SL AMB POCT BILIRUBIN,UA: ABNORMAL
SL AMB POCT BLOOD,UA: ABNORMAL
SL AMB POCT CLARITY,UA: CLEAR
SL AMB POCT COLOR,UA: YELLOW
SL AMB POCT KETONES,UA: ABNORMAL
SL AMB POCT NITRITE,UA: ABNORMAL
SL AMB POCT PH,UA: 6
SL AMB POCT SPECIFIC GRAVITY,UA: 1.02
SL AMB POCT URINE PROTEIN: ABNORMAL
SL AMB POCT UROBILINOGEN: 0.2

## 2022-02-21 PROCEDURE — 52000 CYSTOURETHROSCOPY: CPT | Performed by: UROLOGY

## 2022-02-21 PROCEDURE — 81003 URINALYSIS AUTO W/O SCOPE: CPT | Performed by: UROLOGY

## 2022-02-21 PROCEDURE — 99213 OFFICE O/P EST LOW 20 MIN: CPT | Performed by: UROLOGY

## 2022-02-21 NOTE — PROGRESS NOTES
Assessment/Plan:    Personal history of bladder cancer  No evidence of recurrence is noted on cystoscopy  Urinalysis is negative for blood  BPH with obstruction/lower urinary tract symptoms  AUA symptom score is 11 on tamsulosin  He is pleased his voiding pattern  PSA in October 2021 was 1 6  We will continue to follow on tamsulosin  He will return in 1 year  We will plan to check a PSA in 1 year  Diagnoses and all orders for this visit:    Personal history of bladder cancer  -     POCT urine dip auto non-scope  -     Cystoscopy  -     US kidney and bladder with pvr; Future  -     Urinalysis with microscopic; Future    BPH with obstruction/lower urinary tract symptoms  -     US kidney and bladder with pvr; Future  -     PSA Total, Diagnostic; Future  -     Urinalysis with microscopic; Future          Subjective:      Patient ID: Orion Damico is a 70 y o  male  Benign Prostatic Hypertrophy  This is a chronic problem  The current episode started more than 1 year ago  The problem is unchanged  Irritative symptoms include nocturia (Nocturia x2)  Irritative symptoms do not include urgency  Obstructive symptoms include a slower stream  Obstructive symptoms do not include dribbling, incomplete emptying, an intermittent stream, straining or a weak stream  Pertinent negatives include no chills, dysuria, genital pain, hematuria, hesitancy, nausea or vomiting  AUA score is 8-19  His sexual activity is non-contributory to the current illness  Nothing aggravates the symptoms  Past treatments include tamsulosin  The treatment provided significant relief  He has been using treatment for 2 or more years  History of bladder cancer- the patient has a history of bladder cancer (high-grade T 0 in December 2012)  He has never had a recurrence  He has no gross hematuria, dysuria, irritative voiding symptoms or change in his back/ flank pain      The following portions of the patient's history were reviewed and updated as appropriate: allergies, current medications, past family history, past medical history, past social history, past surgical history and problem list     Review of Systems   Constitutional: Negative for chills, diaphoresis, fatigue and fever  HENT: Negative  Eyes: Negative  Respiratory: Negative  Cardiovascular: Negative  Gastrointestinal: Negative  Negative for nausea and vomiting  Endocrine: Negative  Genitourinary: Positive for nocturia (Nocturia x2)  Negative for dysuria, hematuria, hesitancy, incomplete emptying and urgency  See HPI   Musculoskeletal: Positive for back pain  Chronic back/flank pain  Skin: Negative  Allergic/Immunologic: Negative  Neurological: Negative  Hematological: Negative  Psychiatric/Behavioral: Negative  AUA SYMPTOM SCORE      Most Recent Value   AUA SYMPTOM SCORE    How often have you had a sensation of not emptying your bladder completely after you finished urinating? 0   How often have you had to urinate again less than two hours after you finished urinating? 0   How often have you found you stopped and started again several times when you urinate? 2   How often have you found it difficult to postpone urination? 0   How often have you had a weak urinary stream? 5   How often have you had to push or strain to begin urination? 2   How many times did you most typically get up to urinate from the time you went to bed at night until the time you got up in the morning? 2   Quality of Life: If you were to spend the rest of your life with your urinary condition just the way it is now, how would you feel about that? 1   AUA SYMPTOM SCORE 11            Objective:      /80   Pulse 76   Ht 5' 7" (1 702 m)   Wt 88 9 kg (196 lb)   BMI 30 70 kg/m²            Physical Exam  Vitals reviewed  Constitutional:       General: He is not in acute distress  Appearance: Normal appearance  He is well-developed   He is not ill-appearing, toxic-appearing or diaphoretic  HENT:      Head: Normocephalic and atraumatic  Eyes:      General: No scleral icterus  Conjunctiva/sclera: Conjunctivae normal    Cardiovascular:      Rate and Rhythm: Normal rate  Pulmonary:      Effort: Pulmonary effort is normal    Abdominal:      General: Bowel sounds are normal  There is no distension  Palpations: Abdomen is soft  There is no mass  Tenderness: There is no abdominal tenderness  There is no right CVA tenderness, left CVA tenderness, guarding or rebound  Hernia: No hernia is present  Genitourinary:     Penis: Normal  No phimosis or hypospadias  Testes: Normal          Right: Mass not present  Left: Mass not present  Rectum: Normal       Comments: Prostate 1 5 X enlarged and palpably benign  Musculoskeletal:         General: Normal range of motion  Cervical back: Neck supple  Skin:     General: Skin is warm and dry  Neurological:      General: No focal deficit present  Mental Status: He is alert and oriented to person, place, and time  Psychiatric:         Mood and Affect: Mood normal          Behavior: Behavior normal          Thought Content: Thought content normal          Judgment: Judgment normal             Cystoscopy     Date/Time 2/21/2022 9:51 AM     Performed by  Andrea Taylor MD     Authorized by Andrea Taylor MD      Universal Protocol:  Consent: Verbal consent obtained  Written consent obtained  Risks and benefits: risks, benefits and alternatives were discussed  Consent given by: patient  Patient understanding: patient states understanding of the procedure being performed  Patient identity confirmed: verbally with patient        Procedure Details:  Procedure type: cystoscopy    Patient tolerance: Patient tolerated the procedure well with no immediate complications    Additional Procedure Details:      Patient presents for cystoscopy    I have discussed the reasons for doing the test, and the potential risks and complications  Patient expressed understanding, and signed informed consent document  The patient was carefully  positioned supine on the examining table  Sterile preparation was performed on the urethra  Xylocaine jelly was instilled and left  Indwelling for the procedure  The 13 [place] flexible cystoscope was passed with the following findings:      Urethra:  Normal without stricture    Prostate:  lateral lobes -bilobar obstruction, prostate estimated at 40 g                   Bladder: Moderate trabeculation, no lesions, tumor, or stones  Residual urine:  Small    Patient tolerated the procedure well and was escorted from the examining table

## 2022-02-21 NOTE — ASSESSMENT & PLAN NOTE
AUA symptom score is 11 on tamsulosin  He is pleased his voiding pattern  PSA in October 2021 was 1 6  We will continue to follow on tamsulosin  He will return in 1 year  We will plan to check a PSA in 1 year

## 2022-02-21 NOTE — LETTER
February 21, 2022     Becky Lopez, [address] [place] [id number]    Patient: Jamey Chan   YOB: 1950   Date of Visit: 2/21/2022       Dear Dr Maria Luisa Hardin: Thank you for referring Jamey Chan to me for evaluation  Below are my notes for this consultation  If you have questions, please do not hesitate to call me  I look forward to following your patient along with you  Sincerely,        Laith Johnson MD        CC: No Recipients  Laith Johnson MD  2/21/2022  9:56 AM  Sign when Signing Visit  Assessment/Plan:    Personal history of bladder cancer  No evidence of recurrence is noted on cystoscopy  Urinalysis is negative for blood  BPH with obstruction/lower urinary tract symptoms  AUA symptom score is 11 on tamsulosin  He is pleased his voiding pattern  PSA in October 2021 was 1 6  We will continue to follow on tamsulosin  He will return in 1 year  We will plan to check a PSA in 1 year  Diagnoses and all orders for this visit:    Personal history of bladder cancer  -     POCT urine dip auto non-scope  -     Cystoscopy  -     US kidney and bladder with pvr; Future  -     Urinalysis with microscopic; Future    BPH with obstruction/lower urinary tract symptoms  -     US kidney and bladder with pvr; Future  -     PSA Total, Diagnostic; Future  -     Urinalysis with microscopic; Future          Subjective:      Patient ID: Jamey Chan is a 70 y o  male  Benign Prostatic Hypertrophy  This is a chronic problem  The current episode started more than 1 year ago  The problem is unchanged  Irritative symptoms include nocturia (Nocturia x2)  Irritative symptoms do not include urgency  Obstructive symptoms include a slower stream  Obstructive symptoms do not include dribbling, incomplete emptying, an intermittent stream, straining or a weak stream  Pertinent negatives include no chills, dysuria, genital pain, hematuria, hesitancy, nausea or vomiting   AUA score is 8-19  His sexual activity is non-contributory to the current illness  Nothing aggravates the symptoms  Past treatments include tamsulosin  The treatment provided significant relief  He has been using treatment for 2 or more years  History of bladder cancer- the patient has a history of bladder cancer (high-grade T 0 in December 2012)  He has never had a recurrence  He has no gross hematuria, dysuria, irritative voiding symptoms or change in his back/ flank pain  The following portions of the patient's history were reviewed and updated as appropriate: allergies, current medications, past family history, past medical history, past social history, past surgical history and problem list     Review of Systems   Constitutional: Negative for chills, diaphoresis, fatigue and fever  HENT: Negative  Eyes: Negative  Respiratory: Negative  Cardiovascular: Negative  Gastrointestinal: Negative  Negative for nausea and vomiting  Endocrine: Negative  Genitourinary: Positive for nocturia (Nocturia x2)  Negative for dysuria, hematuria, hesitancy, incomplete emptying and urgency  See HPI   Musculoskeletal: Positive for back pain  Chronic back/flank pain  Skin: Negative  Allergic/Immunologic: Negative  Neurological: Negative  Hematological: Negative  Psychiatric/Behavioral: Negative  AUA SYMPTOM SCORE      Most Recent Value   AUA SYMPTOM SCORE    How often have you had a sensation of not emptying your bladder completely after you finished urinating? 0   How often have you had to urinate again less than two hours after you finished urinating? 0   How often have you found you stopped and started again several times when you urinate? 2   How often have you found it difficult to postpone urination? 0   How often have you had a weak urinary stream? 5   How often have you had to push or strain to begin urination?  2   How many times did you most typically get up to urinate from the time you went to bed at night until the time you got up in the morning? 2   Quality of Life: If you were to spend the rest of your life with your urinary condition just the way it is now, how would you feel about that? 1   AUA SYMPTOM SCORE 11            Objective:      /80   Pulse 76   Ht 5' 7" (1 702 m)   Wt 88 9 kg (196 lb)   BMI 30 70 kg/m²            Physical Exam  Vitals reviewed  Constitutional:       General: He is not in acute distress  Appearance: Normal appearance  He is well-developed  He is not ill-appearing, toxic-appearing or diaphoretic  HENT:      Head: Normocephalic and atraumatic  Eyes:      General: No scleral icterus  Conjunctiva/sclera: Conjunctivae normal    Cardiovascular:      Rate and Rhythm: Normal rate  Pulmonary:      Effort: Pulmonary effort is normal    Abdominal:      General: Bowel sounds are normal  There is no distension  Palpations: Abdomen is soft  There is no mass  Tenderness: There is no abdominal tenderness  There is no right CVA tenderness, left CVA tenderness, guarding or rebound  Hernia: No hernia is present  Genitourinary:     Penis: Normal  No phimosis or hypospadias  Testes: Normal          Right: Mass not present  Left: Mass not present  Rectum: Normal       Comments: Prostate 1 5 X enlarged and palpably benign  Musculoskeletal:         General: Normal range of motion  Cervical back: Neck supple  Skin:     General: Skin is warm and dry  Neurological:      General: No focal deficit present  Mental Status: He is alert and oriented to person, place, and time  Psychiatric:         Mood and Affect: Mood normal          Behavior: Behavior normal          Thought Content:  Thought content normal          Judgment: Judgment normal             Cystoscopy     Date/Time 2/21/2022 9:51 AM     Performed by  Danielle Johnson MD     Authorized by Danielle Johnson MD      Universal Protocol:  Consent: Verbal consent obtained  Written consent obtained  Risks and benefits: risks, benefits and alternatives were discussed  Consent given by: patient  Patient understanding: patient states understanding of the procedure being performed  Patient identity confirmed: verbally with patient        Procedure Details:  Procedure type: cystoscopy    Patient tolerance: Patient tolerated the procedure well with no immediate complications    Additional Procedure Details:      Patient presents for cystoscopy  I have discussed the reasons for doing the test, and the potential risks and complications  Patient expressed understanding, and signed informed consent document  The patient was carefully  positioned supine on the examining table  Sterile preparation was performed on the urethra  Xylocaine jelly was instilled and left  Indwelling for the procedure  The 13 [place] flexible cystoscope was passed with the following findings:      Urethra:  Normal without stricture    Prostate:  lateral lobes -bilobar obstruction, prostate estimated at 40 g                   Bladder: Moderate trabeculation, no lesions, tumor, or stones  Residual urine:  Small    Patient tolerated the procedure well and was escorted from the examining table

## 2022-03-22 ENCOUNTER — HOSPITAL ENCOUNTER (OUTPATIENT)
Dept: ULTRASOUND IMAGING | Facility: HOSPITAL | Age: 72
Discharge: HOME | End: 2022-03-22
Payer: COMMERCIAL

## 2022-03-22 DIAGNOSIS — N13.8 BPH WITH OBSTRUCTION/LOWER URINARY TRACT SYMPTOMS: ICD-10-CM

## 2022-03-22 DIAGNOSIS — N40.1 BPH WITH OBSTRUCTION/LOWER URINARY TRACT SYMPTOMS: ICD-10-CM

## 2022-03-22 DIAGNOSIS — Z85.51 PERSONAL HISTORY OF BLADDER CANCER: ICD-10-CM

## 2022-03-22 PROCEDURE — 76770 US EXAM ABDO BACK WALL COMP: CPT

## 2022-12-29 ENCOUNTER — TELEPHONE (OUTPATIENT)
Dept: UROLOGY | Facility: AMBULATORY SURGERY CENTER | Age: 72
End: 2022-12-29

## 2022-12-29 NOTE — TELEPHONE ENCOUNTER
Pt called and stated he thought due to this being his 11th yr of check ups that he would not need to have a cysto done please review with Dr Farida Bowman if pt needs a cysto appt or just annual f/u appt  Pt AVS stated a cysto is needed but pt is asking if that is necessary   Please call pt with suggested type of appt     Pt call [phone]

## 2023-01-13 DIAGNOSIS — N40.1 BPH WITH OBSTRUCTION/LOWER URINARY TRACT SYMPTOMS: ICD-10-CM

## 2023-01-13 DIAGNOSIS — N13.8 BPH WITH OBSTRUCTION/LOWER URINARY TRACT SYMPTOMS: ICD-10-CM

## 2023-01-13 RX ORDER — TAMSULOSIN HYDROCHLORIDE 0.4 MG/1
CAPSULE ORAL
Qty: 90 CAPSULE | Refills: 3 | Status: SHIPPED | OUTPATIENT
Start: 2023-01-13

## 2023-04-03 ENCOUNTER — PROCEDURE VISIT (OUTPATIENT)
Dept: UROLOGY | Facility: MEDICAL CENTER | Age: 73
End: 2023-04-03

## 2023-04-03 VITALS
HEART RATE: 86 BPM | HEIGHT: 71 IN | BODY MASS INDEX: 27.58 KG/M2 | WEIGHT: 197 LBS | SYSTOLIC BLOOD PRESSURE: 144 MMHG | DIASTOLIC BLOOD PRESSURE: 80 MMHG

## 2023-04-03 DIAGNOSIS — N13.8 BPH WITH OBSTRUCTION/LOWER URINARY TRACT SYMPTOMS: Primary | ICD-10-CM

## 2023-04-03 DIAGNOSIS — Z85.51 PERSONAL HISTORY OF BLADDER CANCER: ICD-10-CM

## 2023-04-03 DIAGNOSIS — N40.1 BPH WITH OBSTRUCTION/LOWER URINARY TRACT SYMPTOMS: Primary | ICD-10-CM

## 2023-04-03 LAB
SL AMB  POCT GLUCOSE, UA: NORMAL
SL AMB LEUKOCYTE ESTERASE,UA: NORMAL
SL AMB POCT BILIRUBIN,UA: NORMAL
SL AMB POCT BLOOD,UA: NORMAL
SL AMB POCT CLARITY,UA: CLEAR
SL AMB POCT COLOR,UA: YELLOW
SL AMB POCT KETONES,UA: NORMAL
SL AMB POCT NITRITE,UA: NORMAL
SL AMB POCT PH,UA: 5.5
SL AMB POCT SPECIFIC GRAVITY,UA: 1.03
SL AMB POCT URINE PROTEIN: NORMAL
SL AMB POCT UROBILINOGEN: 0.2

## 2023-04-03 RX ORDER — FAMOTIDINE 20 MG/1
TABLET, FILM COATED ORAL
COMMUNITY
Start: 2023-03-17

## 2023-04-03 RX ORDER — EVOLOCUMAB 420 MG/3.5
KIT SUBCUTANEOUS
COMMUNITY
Start: 2023-01-25

## 2023-04-03 RX ORDER — IBUPROFEN 800 MG/1
TABLET ORAL
COMMUNITY
Start: 2023-03-17

## 2023-04-03 NOTE — LETTER
April 3, 2023     Eladia Drake, [address] [address]    Patient: Freddy Ledezma   YOB: 1950   Date of Visit: 4/3/2023       Dear Dr Christine Vela: Thank you for referring Freddy Ledezma to me for evaluation  Below are my notes for this consultation  If you have questions, please do not hesitate to call me  I look forward to following your patient along with you  Sincerely,        Sean Lopez MD        CC: No Recipients  Sean Lopez MD  4/3/2023  1:50 PM  Incomplete  Assessment/Plan:    Personal history of bladder cancer  No evidence of recurrence is noted on cystoscopy  Urinalysis today is negative  Last year a renal ultrasound revealed the upper tracts to be unremarkable  Based on NCCN guidelines upper tract testing is not needed  We will plan to repeat his cystoscopy in 1 year  BPH with obstruction/lower urinary tract symptoms  AUA symptom score is 9 on tamsulosin  He is pleased with his voiding pattern  PSA was 2 09 in December 2022  We will plan to continue to follow his voiding pattern on tamsulosin  He will return in 1 year and we will recheck a PSA prior to his next visit  Diagnoses and all orders for this visit:    BPH with obstruction/lower urinary tract symptoms  -     POCT urine dip auto non-scope  -     PSA Total, Diagnostic; Future    Personal history of bladder cancer  -     POCT urine dip auto non-scope  -     Cystoscopy  -     Urinalysis with microscopic; Future    Other orders  -     Repatha Pushtronex System 420 MG/3 5ML SOCT; PLEASE SEE ATTACHED FOR DETAILED DIRECTIONS  -     famotidine (PEPCID) 20 mg tablet; TAKE 1 TABLET BY MOUTH EVERY DAY AT NIGHT  -     ibuprofen (MOTRIN) 800 mg tablet; TAKE 1 TABLET BY MOUTH EVERY 6 HOURS AS NEEDED FOR MILD PAIN (PAIN SCORE 1-3)  Subjective:     Patient ID: Freddy Ledezma is a 67 y o  male  Benign Prostatic Hypertrophy  This is a chronic problem   The current episode started more than 1 year ago  The problem is unchanged  Irritative symptoms include nocturia (Nocturia x2)  Irritative symptoms do not include urgency  Obstructive symptoms include a slower stream  Obstructive symptoms do not include dribbling, incomplete emptying, an intermittent stream, straining or a weak stream  Pertinent negatives include no chills, dysuria, genital pain, hematuria, hesitancy, nausea or vomiting  AUA score is 8-19  His sexual activity is non-contributory to the current illness  Nothing aggravates the symptoms  Past treatments include tamsulosin  The treatment provided significant relief  He has been using treatment for 2 or more years  History of bladder cancer- the patient has a history of bladder cancer (high-grade T 0 in December 2012)  He has never had a recurrence  He has no gross hematuria, dysuria, irritative voiding symptoms or change in his back/ flank pain  The following portions of the patient's history were reviewed and updated as appropriate: allergies, current medications, past family history, past medical history, past social history, past surgical history and problem list     Review of Systems   Constitutional: Negative for chills, diaphoresis, fatigue and fever  HENT: Negative  Eyes: Negative  Respiratory: Negative  Cardiovascular: Negative  Gastrointestinal: Negative  Negative for nausea and vomiting  Endocrine: Negative  Genitourinary: Positive for nocturia (Nocturia x2)  Negative for dysuria, hematuria, hesitancy, incomplete emptying and urgency  See HPI   Musculoskeletal: Positive for back pain  Chronic back/flank pain  Skin: Negative  Allergic/Immunologic: Negative  Neurological: Negative  Hematological: Negative  Psychiatric/Behavioral: Negative         AUA SYMPTOM SCORE    Flowsheet Row Most Recent Value   AUA SYMPTOM SCORE    How often have you had a sensation of not emptying your bladder completely after you finished "urinating? 0   How often have you had to urinate again less than two hours after you finished urinating? 0   How often have you found you stopped and started again several times when you urinate? 2   How often have you found it difficult to postpone urination? 0   How often have you had a weak urinary stream? 5   How often have you had to push or strain to begin urination? 0   How many times did you most typically get up to urinate from the time you went to bed at night until the time you got up in the morning? 2   Quality of Life: If you were to spend the rest of your life with your urinary condition just the way it is now, how would you feel about that? 1   AUA SYMPTOM SCORE 9              Objective:      /80   Pulse 86   Ht 5' 11\" (1 803 m)   Wt 89 4 kg (197 lb)   BMI 27 48 kg/m²           Physical Exam  Vitals reviewed  Constitutional:       General: He is not in acute distress  Appearance: Normal appearance  He is well-developed and normal weight  He is not ill-appearing, toxic-appearing or diaphoretic  HENT:      Head: Normocephalic and atraumatic  Eyes:      General: No scleral icterus  Conjunctiva/sclera: Conjunctivae normal    Cardiovascular:      Rate and Rhythm: Normal rate  Pulmonary:      Effort: Pulmonary effort is normal    Abdominal:      General: Bowel sounds are normal  There is no distension  Palpations: Abdomen is soft  There is no mass  Tenderness: There is no abdominal tenderness  There is no right CVA tenderness, left CVA tenderness, guarding or rebound  Hernia: No hernia is present  Genitourinary:     Penis: Normal  No phimosis or hypospadias  Testes: Normal          Right: Mass not present  Left: Mass not present  Rectum: Normal       Comments: Prostate 1 5 X enlarged and palpably benign  Musculoskeletal:         General: Normal range of motion  Cervical back: Neck supple  Skin:     General: Skin is warm and dry   " Neurological:      General: No focal deficit present  Mental Status: He is alert and oriented to person, place, and time  Psychiatric:         Mood and Affect: Mood normal          Behavior: Behavior normal          Thought Content: Thought content normal          Judgment: Judgment normal            Cystoscopy     Date/Time 4/3/2023 1:46 PM     Performed by  Bruce Thomas MD     Authorized by Bruce Thomas MD      Universal Protocol:  Consent: Verbal consent obtained  Written consent obtained  Risks and benefits: risks, benefits and alternatives were discussed  Consent given by: patient  Patient understanding: patient states understanding of the procedure being performed  Patient consent: the patient's understanding of the procedure matches consent given  Procedure consent: procedure consent matches procedure scheduled  Patient identity confirmed: verbally with patient        Procedure Details:  Procedure type: cystoscopy    Patient tolerance: Patient tolerated the procedure well with no immediate complications    Additional Procedure Details:      Patient presents for cystoscopy  I have discussed the reasons for doing the exam, and the potential risks and complications  Patient expressed understanding, and signed informed consent document  The patient was carefully  positioned supine on the examining table  Sterile preparation was performed on the urethra  Xylocaine jelly was instilled and left  Indwelling for the procedure  The 13 [place]ese flexible cystoscope was passed with the following findings:      Urethra: Normal without stricture    Prostate:  lateral lobes-bilobar prostatic obstruction approximately 40 g                  Bladder: Moderate trabeculation, no lesions, tumor, or stones  Orthotopic ureteral orifices  Residual urine: Small    Patient tolerated the procedure well and was escorted from the examining table        Bruce Thomas MD  4/3/2023  1:47 PM  Sign when Signing Visit  Assessment/Plan:    No problem-specific Assessment & Plan notes found for this encounter  Diagnoses and all orders for this visit:    BPH with obstruction/lower urinary tract symptoms  -     POCT urine dip auto non-scope    Personal history of bladder cancer  -     POCT urine dip auto non-scope    Other orders  -     Repatha Pushtronex System 420 MG/3 5ML SOCT; PLEASE SEE ATTACHED FOR DETAILED DIRECTIONS  -     famotidine (PEPCID) 20 mg tablet; TAKE 1 TABLET BY MOUTH EVERY DAY AT NIGHT  -     ibuprofen (MOTRIN) 800 mg tablet; TAKE 1 TABLET BY MOUTH EVERY 6 HOURS AS NEEDED FOR MILD PAIN (PAIN SCORE 1-3)  Subjective:     Patient ID: Sol Webb is a 67 y o  male  Benign Prostatic Hypertrophy  This is a chronic problem  The current episode started more than 1 year ago  The problem is unchanged  Irritative symptoms include nocturia (Nocturia x2)  Irritative symptoms do not include urgency  Obstructive symptoms include a slower stream  Obstructive symptoms do not include dribbling, incomplete emptying, an intermittent stream, straining or a weak stream  Pertinent negatives include no chills, dysuria, genital pain, hematuria, hesitancy, nausea or vomiting  AUA score is 8-19  His sexual activity is non-contributory to the current illness  Nothing aggravates the symptoms  Past treatments include tamsulosin  The treatment provided significant relief  He has been using treatment for 2 or more years  History of bladder cancer- the patient has a history of bladder cancer (high-grade T 0 in December 2012)  He has never had a recurrence  He has no gross hematuria, dysuria, irritative voiding symptoms or change in his back/ flank pain      The following portions of the patient's history were reviewed and updated as appropriate: allergies, current medications, past family history, past medical history, past social history, past surgical history and problem list     Review of Systems   Constitutional: "Negative for chills, diaphoresis, fatigue and fever  HENT: Negative  Eyes: Negative  Respiratory: Negative  Cardiovascular: Negative  Gastrointestinal: Negative  Negative for nausea and vomiting  Endocrine: Negative  Genitourinary: Positive for nocturia (Nocturia x2)  Negative for dysuria, hematuria, hesitancy, incomplete emptying and urgency  See HPI   Musculoskeletal: Positive for back pain  Chronic back/flank pain  Skin: Negative  Allergic/Immunologic: Negative  Neurological: Negative  Hematological: Negative  Psychiatric/Behavioral: Negative  AUA SYMPTOM SCORE    Flowsheet Row Most Recent Value   AUA SYMPTOM SCORE    How often have you had a sensation of not emptying your bladder completely after you finished urinating? 0   How often have you had to urinate again less than two hours after you finished urinating? 0   How often have you found you stopped and started again several times when you urinate? 2   How often have you found it difficult to postpone urination? 0   How often have you had a weak urinary stream? 5   How often have you had to push or strain to begin urination? 0   How many times did you most typically get up to urinate from the time you went to bed at night until the time you got up in the morning? 2   Quality of Life: If you were to spend the rest of your life with your urinary condition just the way it is now, how would you feel about that? 1   AUA SYMPTOM SCORE 9              Objective:      /80   Pulse 86   Ht 5' 11\" (1 803 m)   Wt 89 4 kg (197 lb)   BMI 27 48 kg/m²           Physical Exam  Vitals reviewed  Constitutional:       General: He is not in acute distress  Appearance: Normal appearance  He is well-developed and normal weight  He is not ill-appearing, toxic-appearing or diaphoretic  HENT:      Head: Normocephalic and atraumatic  Eyes:      General: No scleral icterus       Conjunctiva/sclera: Conjunctivae " normal    Cardiovascular:      Rate and Rhythm: Normal rate  Pulmonary:      Effort: Pulmonary effort is normal    Abdominal:      General: Bowel sounds are normal  There is no distension  Palpations: Abdomen is soft  There is no mass  Tenderness: There is no abdominal tenderness  There is no right CVA tenderness, left CVA tenderness, guarding or rebound  Hernia: No hernia is present  Genitourinary:     Penis: Normal  No phimosis or hypospadias  Testes: Normal          Right: Mass not present  Left: Mass not present  Rectum: Normal       Comments: Prostate 1 5 X enlarged and palpably benign  Musculoskeletal:         General: Normal range of motion  Cervical back: Neck supple  Skin:     General: Skin is warm and dry  Neurological:      General: No focal deficit present  Mental Status: He is alert and oriented to person, place, and time  Psychiatric:         Mood and Affect: Mood normal          Behavior: Behavior normal          Thought Content: Thought content normal          Judgment: Judgment normal            Cystoscopy     Date/Time 4/3/2023 1:46 PM     Performed by  Shannon Gates MD     Authorized by Shannon Gates MD      Universal Protocol:  Consent: Verbal consent obtained  Written consent obtained  Risks and benefits: risks, benefits and alternatives were discussed  Consent given by: patient  Patient understanding: patient states understanding of the procedure being performed  Patient consent: the patient's understanding of the procedure matches consent given  Procedure consent: procedure consent matches procedure scheduled  Patient identity confirmed: verbally with patient        Procedure Details:  Procedure type: cystoscopy    Patient tolerance: Patient tolerated the procedure well with no immediate complications    Additional Procedure Details:      Patient presents for cystoscopy    I have discussed the reasons for doing the exam, and the potential risks and complications  Patient expressed understanding, and signed informed consent document  The patient was carefully  positioned supine on the examining table  Sterile preparation was performed on the urethra  Xylocaine jelly was instilled and left  Indwelling for the procedure  The 13 [place] flexible cystoscope was passed with the following findings:      Urethra: Normal without stricture    Prostate:  lateral lobes-bilobar prostatic obstruction approximately 40 g                  Bladder: Moderate trabeculation, no lesions, tumor, or stones  Orthotopic ureteral orifices  Residual urine: Small    Patient tolerated the procedure well and was escorted from the examining table

## 2023-04-03 NOTE — ASSESSMENT & PLAN NOTE
AUA symptom score is 9 on tamsulosin  He is pleased with his voiding pattern  PSA was 2 09 in December 2022  We will plan to continue to follow his voiding pattern on tamsulosin  He will return in 1 year and we will recheck a PSA prior to his next visit

## 2023-04-03 NOTE — ASSESSMENT & PLAN NOTE
No evidence of recurrence is noted on cystoscopy  Urinalysis today is negative  Last year a renal ultrasound revealed the upper tracts to be unremarkable  Based on NCCN guidelines upper tract testing is not needed  We will plan to repeat his cystoscopy in 1 year

## 2023-04-03 NOTE — PROGRESS NOTES
Assessment/Plan:    Personal history of bladder cancer  No evidence of recurrence is noted on cystoscopy  Urinalysis today is negative  Last year a renal ultrasound revealed the upper tracts to be unremarkable  Based on NCCN guidelines upper tract testing is not needed  We will plan to repeat his cystoscopy in 1 year  BPH with obstruction/lower urinary tract symptoms  AUA symptom score is 9 on tamsulosin  He is pleased with his voiding pattern  PSA was 2 09 in December 2022  We will plan to continue to follow his voiding pattern on tamsulosin  He will return in 1 year and we will recheck a PSA prior to his next visit  Diagnoses and all orders for this visit:    BPH with obstruction/lower urinary tract symptoms  -     POCT urine dip auto non-scope  -     PSA Total, Diagnostic; Future    Personal history of bladder cancer  -     POCT urine dip auto non-scope  -     Cystoscopy  -     Urinalysis with microscopic; Future    Other orders  -     Repatha Pushtronex System 420 MG/3 5ML SOCT; PLEASE SEE ATTACHED FOR DETAILED DIRECTIONS  -     famotidine (PEPCID) 20 mg tablet; TAKE 1 TABLET BY MOUTH EVERY DAY AT NIGHT  -     ibuprofen (MOTRIN) 800 mg tablet; TAKE 1 TABLET BY MOUTH EVERY 6 HOURS AS NEEDED FOR MILD PAIN (PAIN SCORE 1-3)  Subjective:      Patient ID: Che Holcomb is a 67 y o  male  Benign Prostatic Hypertrophy  This is a chronic problem  The current episode started more than 1 year ago  The problem is unchanged  Irritative symptoms include nocturia (Nocturia x2)  Irritative symptoms do not include urgency  Obstructive symptoms include a slower stream  Obstructive symptoms do not include dribbling, incomplete emptying, an intermittent stream, straining or a weak stream  Pertinent negatives include no chills, dysuria, genital pain, hematuria, hesitancy, nausea or vomiting  AUA score is 8-19  His sexual activity is non-contributory to the current illness  Nothing aggravates the symptoms  Past treatments include tamsulosin  The treatment provided significant relief  He has been using treatment for 2 or more years  History of bladder cancer- the patient has a history of bladder cancer (high-grade T 0 in December 2012)  He has never had a recurrence  He has no gross hematuria, dysuria, irritative voiding symptoms or change in his back/ flank pain  The following portions of the patient's history were reviewed and updated as appropriate: allergies, current medications, past family history, past medical history, past social history, past surgical history and problem list     Review of Systems   Constitutional: Negative for chills, diaphoresis, fatigue and fever  HENT: Negative  Eyes: Negative  Respiratory: Negative  Cardiovascular: Negative  Gastrointestinal: Negative  Negative for nausea and vomiting  Endocrine: Negative  Genitourinary: Positive for nocturia (Nocturia x2)  Negative for dysuria, hematuria, hesitancy, incomplete emptying and urgency  See HPI   Musculoskeletal: Positive for back pain  Chronic back/flank pain  Skin: Negative  Allergic/Immunologic: Negative  Neurological: Negative  Hematological: Negative  Psychiatric/Behavioral: Negative  AUA SYMPTOM SCORE    Flowsheet Row Most Recent Value   AUA SYMPTOM SCORE    How often have you had a sensation of not emptying your bladder completely after you finished urinating? 0   How often have you had to urinate again less than two hours after you finished urinating? 0   How often have you found you stopped and started again several times when you urinate? 2   How often have you found it difficult to postpone urination? 0   How often have you had a weak urinary stream? 5   How often have you had to push or strain to begin urination? 0   How many times did you most typically get up to urinate from the time you went to bed at night until the time you got up in the morning?  2   Quality of "Life: If you were to spend the rest of your life with your urinary condition just the way it is now, how would you feel about that? 1   AUA SYMPTOM SCORE 9              Objective:      /80   Pulse 86   Ht 5' 11\" (1 803 m)   Wt 89 4 kg (197 lb)   BMI 27 48 kg/m²            Physical Exam  Vitals reviewed  Constitutional:       General: He is not in acute distress  Appearance: Normal appearance  He is well-developed and normal weight  He is not ill-appearing, toxic-appearing or diaphoretic  HENT:      Head: Normocephalic and atraumatic  Eyes:      General: No scleral icterus  Conjunctiva/sclera: Conjunctivae normal    Cardiovascular:      Rate and Rhythm: Normal rate  Pulmonary:      Effort: Pulmonary effort is normal    Abdominal:      General: Bowel sounds are normal  There is no distension  Palpations: Abdomen is soft  There is no mass  Tenderness: There is no abdominal tenderness  There is no right CVA tenderness, left CVA tenderness, guarding or rebound  Hernia: No hernia is present  Genitourinary:     Penis: Normal  No phimosis or hypospadias  Testes: Normal          Right: Mass not present  Left: Mass not present  Rectum: Normal       Comments: Prostate 1 5 X enlarged and palpably benign  Musculoskeletal:         General: Normal range of motion  Cervical back: Neck supple  Skin:     General: Skin is warm and dry  Neurological:      General: No focal deficit present  Mental Status: He is alert and oriented to person, place, and time  Psychiatric:         Mood and Affect: Mood normal          Behavior: Behavior normal          Thought Content: Thought content normal          Judgment: Judgment normal             Cystoscopy     Date/Time 4/3/2023 1:46 PM     Performed by  Rita Harding MD     Authorized by Rita Harding MD      Universal Protocol:  Consent: Verbal consent obtained  Written consent obtained    Risks and benefits: " risks, benefits and alternatives were discussed  Consent given by: patient  Patient understanding: patient states understanding of the procedure being performed  Patient consent: the patient's understanding of the procedure matches consent given  Procedure consent: procedure consent matches procedure scheduled  Patient identity confirmed: verbally with patient        Procedure Details:  Procedure type: cystoscopy    Patient tolerance: Patient tolerated the procedure well with no immediate complications    Additional Procedure Details:      Patient presents for cystoscopy  I have discussed the reasons for doing the exam, and the potential risks and complications  Patient expressed understanding, and signed informed consent document  The patient was carefully  positioned supine on the examining table  Sterile preparation was performed on the urethra  Xylocaine jelly was instilled and left  Indwelling for the procedure  The 13 [place] flexible cystoscope was passed with the following findings:      Urethra: Normal without stricture    Prostate:  lateral lobes-bilobar prostatic obstruction approximately 40 g                  Bladder: Moderate trabeculation, no lesions, tumor, or stones  Orthotopic ureteral orifices  Residual urine: Small    Patient tolerated the procedure well and was escorted from the examining table

## 2024-03-13 PROBLEM — Z96.652 STATUS POST LEFT KNEE REPLACEMENT: Status: ACTIVE | Noted: 2024-01-09

## 2024-07-31 ENCOUNTER — APPOINTMENT (OUTPATIENT)
Dept: RADIOLOGY | Facility: CLINIC | Age: 74
End: 2024-07-31
Payer: COMMERCIAL

## 2024-07-31 ENCOUNTER — OFFICE VISIT (OUTPATIENT)
Dept: URGENT CARE | Facility: CLINIC | Age: 74
End: 2024-07-31
Payer: COMMERCIAL

## 2024-07-31 VITALS
SYSTOLIC BLOOD PRESSURE: 140 MMHG | DIASTOLIC BLOOD PRESSURE: 76 MMHG | HEIGHT: 71 IN | TEMPERATURE: 98 F | RESPIRATION RATE: 18 BRPM | WEIGHT: 189 LBS | OXYGEN SATURATION: 97 % | HEART RATE: 63 BPM | BODY MASS INDEX: 26.46 KG/M2

## 2024-07-31 DIAGNOSIS — S49.92XA SHOULDER INJURY, LEFT, INITIAL ENCOUNTER: Primary | ICD-10-CM

## 2024-07-31 DIAGNOSIS — S49.92XA SHOULDER INJURY, LEFT, INITIAL ENCOUNTER: ICD-10-CM

## 2024-07-31 DIAGNOSIS — M19.019 SHOULDER ARTHRITIS: ICD-10-CM

## 2024-07-31 PROCEDURE — 99213 OFFICE O/P EST LOW 20 MIN: CPT | Performed by: ORTHOPAEDIC SURGERY

## 2024-07-31 PROCEDURE — 73030 X-RAY EXAM OF SHOULDER: CPT

## 2024-07-31 RX ORDER — ACETAMINOPHEN 160 MG
TABLET,DISINTEGRATING ORAL
COMMUNITY

## 2024-07-31 RX ORDER — PYRIDOXINE HCL (VITAMIN B6) 100 MG
TABLET ORAL
COMMUNITY

## 2024-07-31 NOTE — PROGRESS NOTES
[facility]        NAME: Colin Walter is a 73 y.o. male  : 1950    MRN: [id number]  DATE: 2024  TIME: 10:43 AM    Assessment and Plan   Shoulder injury, left, initial encounter [S49.92XA]  1. Shoulder injury, left, initial encounter  XR shoulder 2+ vw left      2. Shoulder arthritis          XR of the left shoulder reviewed and discussed with the patient, showing severe glenohumeral joint degenerative changes with osteophyte formation. Less severe AC joint degenerative changes noted. No obvious acute fractures identified, though will await final radiology report.     Patient Instructions     Follow up with Dr. Gonzales  Follow up with PCP in 3-5 days.  Proceed to  ER if symptoms worsen.    If tests are performed, our office will contact you with results only if changes need to made to the care plan discussed with you at the visit. You can review your full results on [facility].    Chief Complaint     Chief Complaint   Patient presents with    Shoulder Injury     Fell last night onto his left shoulder. Has a history of shoulder problems.          History of Present Illness       73-year-old male presents to the urgent care for evaluation of acute left shoulder pain.  The patient states last night while trying to get up onto his zero turn his flip-flop caught on the machine and he fell, hitting his left shoulder off of the mower.  He does have a known past medical history of severe osteoarthritis of the left shoulder as well as rotator cuff tears.  He is in the care of of orthopedic specialist Dr. Gonzales.  When asked where his pain is located he points along the anterior aspect of the shoulder.  He complains of pain on any movement of the shoulder, and notes that he is unable to raise his arm any significant height without using his right arm to lift it.  He denies any numbness or tingling.  For symptom relief he has been using Tylenol.        Review of Systems   Review of Systems    Constitutional:  Negative for chills and fever.   HENT:  Negative for ear pain and sore throat.    Eyes:  Negative for pain and visual disturbance.   Respiratory:  Negative for cough and shortness of breath.    Cardiovascular:  Negative for chest pain and palpitations.   Gastrointestinal:  Negative for abdominal pain and vomiting.   Genitourinary:  Negative for dysuria and hematuria.   Musculoskeletal:  Positive for arthralgias. Negative for back pain.   Skin:  Negative for color change and rash.   Neurological:  Negative for seizures and syncope.   All other systems reviewed and are negative.        Current Medications       Current Outpatient Medications:     amLODIPine-benazepril (LOTREL) 10-20 MG per capsule, Take 1 capsule by mouth daily, Disp: , Rfl: 1    aspirin 81 MG tablet, Take by mouth daily, Disp: , Rfl:     dextran 70-hypromellose (GENTEAL TEARS) 0.1-0.3 % ophthalmic solution, 1 drop every 3 (three) hours as needed, Disp: , Rfl:     Multiple Vitamins-Minerals (CENTRUM SILVER PO), Take by mouth daily, Disp: , Rfl:     Omega-3 Fatty Acids (FISH OIL PO), Take 2 g by mouth daily, Disp: , Rfl:     rosuvastatin (CRESTOR) 20 MG tablet, Take by mouth daily, Disp: , Rfl:     sertraline (ZOLOFT) 50 mg tablet, Take 50 mg by mouth daily, Disp: , Rfl: 1    tamsulosin (FLOMAX) 0.4 mg, TAKE 1 CAPSULE BY MOUTH 1/2 HOUR AFTER DINNER, Disp: 90 capsule, Rfl: 3    timolol (TIMOPTIC) 0.25 % ophthalmic solution, , Disp: , Rfl: 2    Cholecalciferol (Vitamin D3) 50 MCG (2000 UT) capsule, 2000IU, Disp: , Rfl:     Cranberry 500 MG CAPS, 500mg, Disp: , Rfl:     famotidine (PEPCID) 20 mg tablet, TAKE 1 TABLET BY MOUTH EVERY DAY AT NIGHT (Patient not taking: Reported on 3/13/2024), Disp: , Rfl:     ibuprofen (MOTRIN) 800 mg tablet, TAKE 1 TABLET BY MOUTH EVERY 6 HOURS AS NEEDED FOR MILD PAIN (PAIN SCORE 1-3). (Patient not taking: Reported on 7/31/2024), Disp: , Rfl:     pyridoxine (VITAMIN B6) 100 mg tablet, Take 100 mg by mouth  daily, Disp: , Rfl:     Repatha Pushtronex System 420 MG/3.5ML SOCT, PLEASE SEE ATTACHED FOR DETAILED DIRECTIONS (Patient not taking: Reported on 3/13/2024), Disp: , Rfl:     simvastatin (ZOCOR) 40 mg tablet, Take 40 mg by mouth daily (Patient not taking: Reported on 7/31/2024), Disp: , Rfl:     Current Allergies     Allergies as of 07/31/2024 - Reviewed 07/31/2024   Allergen Reaction Noted    No known allergies  01/24/2020            The following portions of the patient's history were reviewed and updated as appropriate: allergies, current medications, past family history, past medical history, past social history, past surgical history and problem list.     Past Medical History:   Diagnosis Date    Benign localized prostatic hyperplasia with lower urinary tract symptoms (LUTS)     Bladder trabeculation     Depressive disorder     Gross hematuria     Hyperlipidemia     Hypertension     Lesion of bladder     Malignant neoplasm of overlapping sites of bladder (HCC)     Nocturia     Skin melanoma (HCC)     Weak urinary stream        Past Surgical History:   Procedure Laterality Date    APPENDECTOMY      COLONOSCOPY  09/08/2020    Dr Painting    COLONOSCOPY  03/01/2017    Dr Painting - Rt colon polyp - Tubular adenoma fragments    COLONOSCOPY  01/13/2014    Dr Painting - Left colon polyp - tubular adenoma; Distal transverse colon polyp - Tubular Adenoma    COLONOSCOPY  05/01/2024    [facility]- MARIE Painting MD.- Polyps in ascending colon and descending colon. Bx: Tubular adenomata; negative high grade dysplasia; sessile serrated adenoma/polyp, fragments.    CYSTOSCOPY  2012, 2013, 2014, 2015, 2016, 2017, 2018    EYE SURGERY Right     For treatment of melanoma    TRANSURETHRAL RESECTION OF BLADDER TUMOR  2012    > 5 cm        Family History   Problem Relation Age of Onset    Diabetes Mother     Heart disease Mother     Cancer Father          Medications have been verified.        Objective   /76   Pulse 63   Temp 98 °F (36.7  "°C)   Resp 18   Ht 5' 11\" (1.803 m)   Wt 85.7 kg (189 lb)   SpO2 97%   BMI 26.36 kg/m²        Physical Exam     Physical Exam  Vitals and nursing note reviewed.   Constitutional:       General: He is not in acute distress.     Appearance: Normal appearance. He is not ill-appearing.   HENT:      Head: Normocephalic and atraumatic.      Nose: Nose normal.      Mouth/Throat:      Mouth: Mucous membranes are moist.      Pharynx: Oropharynx is clear.   Eyes:      Extraocular Movements: Extraocular movements intact.      Pupils: Pupils are equal, round, and reactive to light.   Cardiovascular:      Rate and Rhythm: Normal rate.      Pulses: Normal pulses.   Pulmonary:      Effort: Pulmonary effort is normal. No respiratory distress.   Musculoskeletal:      Cervical back: Normal range of motion.      Comments: Left shoulder:  Skin without lesions, ecchymosis, erythema, warmth, swelling.  The patient does complain of palpable tenderness along the lateral aspect of the shoulder at the level of the humeral head/rotator cuff insertion.  No tenderness along the cervical spine, posterior shoulder, AC joint.  Patient has full active range of motion of the digits, wrist, and elbow without complaint.  Intact supination and pronation.  Patient demonstrates significant difficulty with active shoulder range of motion, though passively I am able to obtain  degrees,  degrees. Patient had notably worsening pain when moving shoulder back down to neutral position.  5/5  strength.  Special testing deferred due to diffuse pain and intolerance of range of motion testing.  Sensation motor intact along the radial, median, ulnar distributions.  Strong radial pulse. Brisk cap refill in all fingers.   Skin:     General: Skin is warm and dry.      Capillary Refill: Capillary refill takes less than 2 seconds.   Neurological:      General: No focal deficit present.      Mental Status: He is alert and oriented to person, place, " and time.   Psychiatric:         Mood and Affect: Mood normal.         Behavior: Behavior normal.         Thought Content: Thought content normal.         Judgment: Judgment normal.

## 2025-04-10 DIAGNOSIS — N13.8 BPH WITH OBSTRUCTION/LOWER URINARY TRACT SYMPTOMS: ICD-10-CM

## 2025-04-10 DIAGNOSIS — N40.1 BPH WITH OBSTRUCTION/LOWER URINARY TRACT SYMPTOMS: ICD-10-CM

## 2025-04-10 RX ORDER — TAMSULOSIN HYDROCHLORIDE 0.4 MG/1
0.4 CAPSULE ORAL
Qty: 90 CAPSULE | Refills: 3 | Status: SHIPPED | OUTPATIENT
Start: 2025-04-10